# Patient Record
Sex: MALE | Race: BLACK OR AFRICAN AMERICAN | NOT HISPANIC OR LATINO | Employment: FULL TIME | URBAN - METROPOLITAN AREA
[De-identification: names, ages, dates, MRNs, and addresses within clinical notes are randomized per-mention and may not be internally consistent; named-entity substitution may affect disease eponyms.]

---

## 2017-07-26 ENCOUNTER — ALLSCRIPTS OFFICE VISIT (OUTPATIENT)
Dept: OTHER | Facility: OTHER | Age: 52
End: 2017-07-26

## 2017-07-26 DIAGNOSIS — Z13.6 ENCOUNTER FOR SCREENING FOR CARDIOVASCULAR DISORDERS: ICD-10-CM

## 2017-07-26 DIAGNOSIS — R30.0 DYSURIA: ICD-10-CM

## 2017-07-27 ENCOUNTER — LAB CONVERSION - ENCOUNTER (OUTPATIENT)
Dept: OTHER | Facility: OTHER | Age: 52
End: 2017-07-27

## 2017-07-27 LAB
BILIRUB UR QL STRIP: NEGATIVE
COLOR UR: NORMAL
COMMENT (HISTORICAL): CLEAR
FECAL OCCULT BLOOD DIAGNOSTIC (HISTORICAL): NEGATIVE
GLUCOSE (HISTORICAL): NEGATIVE
KETONES UR STRIP-MCNC: NEGATIVE MG/DL
LEUKOCYTE ESTERASE UR QL STRIP: NEGATIVE
NITRITE UR QL STRIP: NEGATIVE
PH UR STRIP.AUTO: 5.5 [PH] (ref 5–8)
PROT UR STRIP-MCNC: NEGATIVE MG/DL
SP GR UR STRIP.AUTO: 1.01 (ref 1–1.03)

## 2017-08-20 ENCOUNTER — ANESTHESIA EVENT (OUTPATIENT)
Dept: PERIOP | Facility: HOSPITAL | Age: 52
End: 2017-08-20
Payer: COMMERCIAL

## 2017-08-21 ENCOUNTER — HOSPITAL ENCOUNTER (OUTPATIENT)
Facility: HOSPITAL | Age: 52
Setting detail: OUTPATIENT SURGERY
Discharge: HOME | End: 2017-08-21
Attending: INTERNAL MEDICINE | Admitting: INTERNAL MEDICINE
Payer: COMMERCIAL

## 2017-08-21 ENCOUNTER — GENERIC CONVERSION - ENCOUNTER (OUTPATIENT)
Dept: OTHER | Facility: OTHER | Age: 52
End: 2017-08-21

## 2017-08-21 ENCOUNTER — ANESTHESIA (OUTPATIENT)
Dept: PERIOP | Facility: HOSPITAL | Age: 52
End: 2017-08-21
Payer: COMMERCIAL

## 2017-08-21 VITALS
RESPIRATION RATE: 16 BRPM | WEIGHT: 209.44 LBS | OXYGEN SATURATION: 97 % | BODY MASS INDEX: 27.76 KG/M2 | TEMPERATURE: 97 F | SYSTOLIC BLOOD PRESSURE: 122 MMHG | HEIGHT: 73 IN | DIASTOLIC BLOOD PRESSURE: 71 MMHG | HEART RATE: 76 BPM

## 2017-08-21 DIAGNOSIS — Z12.11 ENCOUNTER FOR SCREENING FOR MALIGNANT NEOPLASM OF COLON: ICD-10-CM

## 2017-08-21 DIAGNOSIS — R19.4 CHANGE IN BOWEL HABITS: ICD-10-CM

## 2017-08-21 PROCEDURE — 88305 TISSUE EXAM BY PATHOLOGIST: CPT | Performed by: INTERNAL MEDICINE

## 2017-08-21 RX ORDER — SODIUM CHLORIDE, SODIUM LACTATE, POTASSIUM CHLORIDE, CALCIUM CHLORIDE 600; 310; 30; 20 MG/100ML; MG/100ML; MG/100ML; MG/100ML
125 INJECTION, SOLUTION INTRAVENOUS CONTINUOUS
Status: DISCONTINUED | OUTPATIENT
Start: 2017-08-21 | End: 2017-08-21 | Stop reason: HOSPADM

## 2017-08-21 RX ORDER — LIDOCAINE HYDROCHLORIDE 10 MG/ML
INJECTION, SOLUTION INFILTRATION; PERINEURAL AS NEEDED
Status: DISCONTINUED | OUTPATIENT
Start: 2017-08-21 | End: 2017-08-21 | Stop reason: SURG

## 2017-08-21 RX ORDER — MULTIVITAMIN
1 TABLET ORAL DAILY
COMMUNITY

## 2017-08-21 RX ORDER — PROPOFOL 10 MG/ML
INJECTION, EMULSION INTRAVENOUS AS NEEDED
Status: DISCONTINUED | OUTPATIENT
Start: 2017-08-21 | End: 2017-08-21 | Stop reason: SURG

## 2017-08-21 RX ORDER — SENNA PLUS 8.6 MG/1
1 TABLET ORAL DAILY
COMMUNITY

## 2017-08-21 RX ADMIN — SODIUM CHLORIDE, SODIUM LACTATE, POTASSIUM CHLORIDE, AND CALCIUM CHLORIDE 125 ML/HR: .6; .31; .03; .02 INJECTION, SOLUTION INTRAVENOUS at 08:30

## 2017-08-21 RX ADMIN — PROPOFOL 50 MG: 10 INJECTION, EMULSION INTRAVENOUS at 08:49

## 2017-08-21 RX ADMIN — PROPOFOL 50 MG: 10 INJECTION, EMULSION INTRAVENOUS at 08:54

## 2017-08-21 RX ADMIN — PROPOFOL 100 MG: 10 INJECTION, EMULSION INTRAVENOUS at 08:42

## 2017-08-21 RX ADMIN — LIDOCAINE HYDROCHLORIDE 50 MG: 10 INJECTION, SOLUTION INFILTRATION; PERINEURAL at 08:42

## 2017-08-21 RX ADMIN — PROPOFOL 50 MG: 10 INJECTION, EMULSION INTRAVENOUS at 08:43

## 2017-08-24 ENCOUNTER — GENERIC CONVERSION - ENCOUNTER (OUTPATIENT)
Dept: OTHER | Facility: OTHER | Age: 52
End: 2017-08-24

## 2018-01-11 NOTE — RESULT NOTES
Verified Results  (1) TISSUE EXAM [id number] 08:46AM Abby Cuff     Test Name Result Flag Reference   LAB AP CASE REPORT (Report)     Surgical Pathology Report             Case: [id number]                   Authorizing Provider: Shira Ellis MD  Collected:      08/21/2017 0846        Ordering Location:   [facility] Received:      08/21/2017 [facility]                    Operating Room                                 Pathologist:      Bertram Joshi MD                              Specimen:  Large Intestine, Right/Ascending Colon, Ascending colon   LAB AP FINAL DIAGNOSIS (Report)     A  Colon, ascending, polypectomy:        - 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 or   [facility]  They may not be cleared or approved by the [place]  Food and Drug Administration  The FDA has determined that such clearance   or approval is not necessary  These tests are used for clinical purposes  They should not be regarded as investigational or for research  This   laboratory has been approved by CLIA 88, designated as a high-complexity   laboratory and is qualified to perform these tests  LAB AP GROSS DESCRIPTION (Report)     A  The specimen is received in formalin, labeled with the patient's name   and hospital number, and is designated polyp ascending colon   Specimen   consists of 2 tan-red polypoid tissue fragments measuring 1 2 and 1 3 cm   in greatest dimension  The apparent margin of resection is painted with   blue ink  Also submitted in the container are multiple tan soft tissue   fragments measuring in loose aggregates 1 9 x 1 0 x 0 2 cm  Entire   submitted  3 cassettes with the multiple fragments in cassette 1 in   cassettes 2? ??3 containing one polyp serially sectioned in each cassette  Note: The estimated total formalin fixation time based upon information   provided by the submitting clinician and the standard processing schedule   is 35 75 hours        AEK   LAB AP CLINICAL INFORMATION Hot snare     Hot snare

## 2018-01-12 VITALS
HEIGHT: 73 IN | BODY MASS INDEX: 29.42 KG/M2 | DIASTOLIC BLOOD PRESSURE: 82 MMHG | WEIGHT: 222 LBS | HEART RATE: 87 BPM | SYSTOLIC BLOOD PRESSURE: 134 MMHG

## 2020-04-24 ENCOUNTER — OFFICE VISIT (OUTPATIENT)
Dept: INTERNAL MEDICINE CLINIC | Facility: CLINIC | Age: 55
End: 2020-04-24
Payer: COMMERCIAL

## 2020-04-24 VITALS
TEMPERATURE: 96.4 F | WEIGHT: 221.6 LBS | HEART RATE: 80 BPM | SYSTOLIC BLOOD PRESSURE: 136 MMHG | BODY MASS INDEX: 30.02 KG/M2 | HEIGHT: 72 IN | OXYGEN SATURATION: 99 % | DIASTOLIC BLOOD PRESSURE: 90 MMHG

## 2020-04-24 DIAGNOSIS — R03.0 ELEVATED BP WITHOUT DIAGNOSIS OF HYPERTENSION: ICD-10-CM

## 2020-04-24 DIAGNOSIS — R07.9 CHEST PAIN, UNSPECIFIED TYPE: Primary | ICD-10-CM

## 2020-04-24 DIAGNOSIS — K21.9 GASTROESOPHAGEAL REFLUX DISEASE, ESOPHAGITIS PRESENCE NOT SPECIFIED: ICD-10-CM

## 2020-04-24 PROCEDURE — 3008F BODY MASS INDEX DOCD: CPT | Performed by: NURSE PRACTITIONER

## 2020-04-24 PROCEDURE — 1036F TOBACCO NON-USER: CPT | Performed by: NURSE PRACTITIONER

## 2020-04-24 PROCEDURE — 99204 OFFICE O/P NEW MOD 45 MIN: CPT | Performed by: NURSE PRACTITIONER

## 2020-04-24 PROCEDURE — 93000 ELECTROCARDIOGRAM COMPLETE: CPT | Performed by: NURSE PRACTITIONER

## 2020-04-24 RX ORDER — OMEPRAZOLE 40 MG/1
40 CAPSULE, DELAYED RELEASE ORAL 2 TIMES DAILY
Qty: 60 CAPSULE | Refills: 2 | Status: SHIPPED | OUTPATIENT
Start: 2020-04-24

## 2020-04-28 ENCOUNTER — APPOINTMENT (OUTPATIENT)
Dept: LAB | Facility: CLINIC | Age: 55
End: 2020-04-28
Payer: COMMERCIAL

## 2020-04-28 ENCOUNTER — TELEPHONE (OUTPATIENT)
Dept: INTERNAL MEDICINE CLINIC | Facility: CLINIC | Age: 55
End: 2020-04-28

## 2020-04-28 DIAGNOSIS — R03.0 ELEVATED BP WITHOUT DIAGNOSIS OF HYPERTENSION: ICD-10-CM

## 2020-04-28 LAB
ALBUMIN SERPL BCP-MCNC: 3.7 G/DL (ref 3.5–5)
ALP SERPL-CCNC: 57 U/L (ref 46–116)
ALT SERPL W P-5'-P-CCNC: 28 U/L (ref 12–78)
ANION GAP SERPL CALCULATED.3IONS-SCNC: 4 MMOL/L (ref 4–13)
AST SERPL W P-5'-P-CCNC: 15 U/L (ref 5–45)
BASOPHILS # BLD AUTO: 0.02 THOUSANDS/ΜL (ref 0–0.1)
BASOPHILS NFR BLD AUTO: 1 % (ref 0–1)
BILIRUB SERPL-MCNC: 0.5 MG/DL (ref 0.2–1)
BUN SERPL-MCNC: 8 MG/DL (ref 5–25)
CALCIUM SERPL-MCNC: 8.9 MG/DL (ref 8.3–10.1)
CHLORIDE SERPL-SCNC: 106 MMOL/L (ref 100–108)
CHOLEST SERPL-MCNC: 242 MG/DL (ref 50–200)
CO2 SERPL-SCNC: 30 MMOL/L (ref 21–32)
CREAT SERPL-MCNC: 0.98 MG/DL (ref 0.6–1.3)
EOSINOPHIL # BLD AUTO: 0.06 THOUSAND/ΜL (ref 0–0.61)
EOSINOPHIL NFR BLD AUTO: 2 % (ref 0–6)
ERYTHROCYTE [DISTWIDTH] IN BLOOD BY AUTOMATED COUNT: 14 % (ref 11.6–15.1)
GFR SERPL CREATININE-BSD FRML MDRD: 101 ML/MIN/1.73SQ M
GLUCOSE P FAST SERPL-MCNC: 93 MG/DL (ref 65–99)
HCT VFR BLD AUTO: 46.7 % (ref 36.5–49.3)
HDLC SERPL-MCNC: 39 MG/DL
HGB BLD-MCNC: 15.4 G/DL (ref 12–17)
IMM GRANULOCYTES # BLD AUTO: 0.01 THOUSAND/UL (ref 0–0.2)
IMM GRANULOCYTES NFR BLD AUTO: 0 % (ref 0–2)
LDLC SERPL CALC-MCNC: 147 MG/DL (ref 0–100)
LYMPHOCYTES # BLD AUTO: 0.95 THOUSANDS/ΜL (ref 0.6–4.47)
LYMPHOCYTES NFR BLD AUTO: 28 % (ref 14–44)
MCH RBC QN AUTO: 29.7 PG (ref 26.8–34.3)
MCHC RBC AUTO-ENTMCNC: 33 G/DL (ref 31.4–37.4)
MCV RBC AUTO: 90 FL (ref 82–98)
MONOCYTES # BLD AUTO: 0.34 THOUSAND/ΜL (ref 0.17–1.22)
MONOCYTES NFR BLD AUTO: 10 % (ref 4–12)
NEUTROPHILS # BLD AUTO: 2.03 THOUSANDS/ΜL (ref 1.85–7.62)
NEUTS SEG NFR BLD AUTO: 59 % (ref 43–75)
NONHDLC SERPL-MCNC: 203 MG/DL
NRBC BLD AUTO-RTO: 0 /100 WBCS
PLATELET # BLD AUTO: 157 THOUSANDS/UL (ref 149–390)
PMV BLD AUTO: 12.2 FL (ref 8.9–12.7)
POTASSIUM SERPL-SCNC: 3.9 MMOL/L (ref 3.5–5.3)
PROT SERPL-MCNC: 7.5 G/DL (ref 6.4–8.2)
RBC # BLD AUTO: 5.19 MILLION/UL (ref 3.88–5.62)
SODIUM SERPL-SCNC: 140 MMOL/L (ref 136–145)
TRIGL SERPL-MCNC: 280 MG/DL
WBC # BLD AUTO: 3.41 THOUSAND/UL (ref 4.31–10.16)

## 2020-04-28 PROCEDURE — 80061 LIPID PANEL: CPT

## 2020-04-28 PROCEDURE — 80053 COMPREHEN METABOLIC PANEL: CPT

## 2020-04-28 PROCEDURE — 36415 COLL VENOUS BLD VENIPUNCTURE: CPT

## 2020-04-28 PROCEDURE — 85025 COMPLETE CBC W/AUTO DIFF WBC: CPT

## 2020-07-17 ENCOUNTER — DOCUMENTATION (OUTPATIENT)
Dept: GASTROENTEROLOGY | Facility: CLINIC | Age: 55
End: 2020-07-17

## 2020-07-17 NOTE — LETTER
7/17/2020    Dear Janay Simon,    Review of our records shows you are due for the following:    Colonoscopy    Please call the following office to schedule your appointment:    [place]    We look forward to hearing from you      Sincerely,    Dr Leonard Garza

## 2021-05-26 ENCOUNTER — OFFICE VISIT (OUTPATIENT)
Dept: CARDIOLOGY CLINIC | Facility: CLINIC | Age: 56
End: 2021-05-26
Payer: COMMERCIAL

## 2021-05-26 VITALS
WEIGHT: 204 LBS | SYSTOLIC BLOOD PRESSURE: 112 MMHG | HEIGHT: 73 IN | HEART RATE: 80 BPM | DIASTOLIC BLOOD PRESSURE: 76 MMHG | BODY MASS INDEX: 27.04 KG/M2

## 2021-05-26 DIAGNOSIS — E78.5 HYPERLIPIDEMIA, UNSPECIFIED HYPERLIPIDEMIA TYPE: ICD-10-CM

## 2021-05-26 DIAGNOSIS — R14.0 ABDOMINAL BLOATING: ICD-10-CM

## 2021-05-26 DIAGNOSIS — R07.9 CHEST PAIN, UNSPECIFIED TYPE: Primary | ICD-10-CM

## 2021-05-26 DIAGNOSIS — E66.3 OVERWEIGHT (BMI 25.0-29.9): ICD-10-CM

## 2021-05-26 PROCEDURE — 1036F TOBACCO NON-USER: CPT | Performed by: INTERNAL MEDICINE

## 2021-05-26 PROCEDURE — 99204 OFFICE O/P NEW MOD 45 MIN: CPT | Performed by: INTERNAL MEDICINE

## 2021-05-26 PROCEDURE — 93000 ELECTROCARDIOGRAM COMPLETE: CPT | Performed by: INTERNAL MEDICINE

## 2021-05-26 PROCEDURE — 3008F BODY MASS INDEX DOCD: CPT | Performed by: INTERNAL MEDICINE

## 2021-05-26 NOTE — PROGRESS NOTES
[facility] [address]  [phone]                                              Cardiology Office Consult  Porfirio Cox, 55 y o  male  YOB: 1965  MRN: [id number] Encounter: [id number]      PCP - ELIEZER Ortega    Assessment  1  Chest pain  2  Hyperlipidemia  3  Bloating / GERD    Plan  Chest pain  · Atypical, initial episode 1 month ago, had recurrence yesterday  · Has some pleuritic features as well  · ?musculoskeletal v CAD  · ECG without any acute St-T changes  ·  he does have severe hyperlipidemia, and with persistent symptoms will check an exercise stress test to rule out CAD     Hyperlipidemia   4/28/2020 13:13   Cholesterol 242 (H)   Triglycerides 280 (H)   HDL 39 (L)   Non-HDL Cholesterol 203   LDL Calculated 147 (H)   · Cholesterol levels were quite uncontrolled last year, although it appears he was 15 lb heavier back pain   · Counseled regarding dietary modifications and need for increased exercise   · Repeat lipid panel next month   · If cholesterol continues to be elevated, suggest low-dose statins    Bloating / GERD  ·  tried omeprazole without benefit   · Reports some bloating sensation   · Counseled regarding monitoring for on foods are contributing to this  · ?gluten-allergy  · Follow up with PCP    Orders Placed This Encounter   Procedures    Lipid Panel with Direct LDL reflex    Stress test only, exercise    POCT ECG     Results for orders placed or performed in visit on 05/26/21   POCT ECG    Impression     Normal sinus rhythm, normal ECG     Return in about 4 months (around 9/26/2021), or if symptoms worsen or fail to improve  History of Present Illness    55year-old very pleasant gentleman comes in as a new patient for evaluation of ongoing symptoms of chest pain  He describes it as the left-sided pain, occurring intermittently lasting for up to a day    His initial symptoms happened about a month ago, when he initially requested the appointment  He reports having lifted a heavy slab of marble around that time  It gradually improved, but he had recurrence of the same yesterday, and as a result he is here for evaluation  No clear associated symptoms of nausea, vomiting, diaphoresis or shortness of breath  No complains of palpitations  He does not smoke  No family history of early CAD  Both his parents lived to their 80s, and his mother is still alive at 80  Historical Information   Past Medical History:   Diagnosis Date    Asthma     pt  states he had asthma when he was a child    GERD (gastroesophageal reflux disease)      Past Surgical History:   Procedure Laterality Date    NC COLONOSCOPY FLX DX W/COLLJ SPEC WHEN PFRMD N/A 8/21/2017    Procedure: COLONOSCOPY;  Surgeon: Shanti Flores MD;  Location: [facility]; Service: Gastroenterology     Family History   Problem Relation Age of Onset    Hypertension Father      Current Outpatient Medications on File Prior to Visit   Medication Sig Dispense Refill    Multiple Vitamin (MULTIVITAMIN) tablet Take 1 tablet by mouth daily      omeprazole (PriLOSEC) 40 MG capsule Take 1 capsule (40 mg total) by mouth 2 (two) times a day 60 capsule 2    senna (SENOKOT) 8 6 MG tablet Take 1 tablet by mouth daily       No current facility-administered medications on file prior to visit        Allergies   Allergen Reactions    Pollen Extract      Social History     Socioeconomic History    Marital status: /Civil Union     Spouse name: None    Number of children: None    Years of education: None    Highest education level: None   Occupational History    None   Social Needs    Financial resource strain: None    Food insecurity     Worry: None     Inability: None    Transportation needs     Medical: None     Non-medical: None   Tobacco Use    Smoking status: Never Smoker    Smokeless tobacco: Never Used   Substance and Sexual Activity    Alcohol use: No    Drug use: No    Sexual activity: Not Currently   Lifestyle    Physical activity     Days per week: 0 days     Minutes per session: 0 min    Stress: Not at all   Relationships    Social connections     Talks on phone: None     Gets together: None     Attends Rastafari service: None     Active member of club or organization: None     Attends meetings of clubs or organizations: None     Relationship status: None    Intimate partner violence     Fear of current or ex partner: None     Emotionally abused: None     Physically abused: None     Forced sexual activity: None   Other Topics Concern    None   Social History Narrative    None        Review of Systems   All other systems reviewed and are negative  Vitals:  Vitals:    05/26/21 1510   BP: 112/76   Pulse: 80   Weight: 92 5 kg (204 lb)   Height: 6' 1" (1 854 m)     BMI - Body mass index is 26 91 kg/m²  Wt Readings from Last 7 Encounters:   05/26/21 92 5 kg (204 lb)   04/24/20 101 kg (221 lb 9 6 oz)   08/21/17 95 kg (209 lb 7 oz)   07/26/17 101 kg (222 lb)       Physical Exam  Vitals signs and nursing note reviewed  Constitutional:       General: He is not in acute distress  Appearance: Normal appearance  He is well-developed  He is not ill-appearing or diaphoretic  HENT:      Head: Normocephalic and atraumatic  Nose: No congestion  Eyes:      General: No scleral icterus  Conjunctiva/sclera: Conjunctivae normal    Neck:      Musculoskeletal: Neck supple  No muscular tenderness  Vascular: No carotid bruit or JVD  Cardiovascular:      Rate and Rhythm: Normal rate and regular rhythm  Heart sounds: Normal heart sounds  No murmur  No friction rub  No gallop  Pulmonary:      Effort: Pulmonary effort is normal  No respiratory distress  Breath sounds: Normal breath sounds  No wheezing or rales  Chest:      Chest wall: No tenderness  Abdominal:      General: There is no distension  Palpations: Abdomen is soft  Tenderness: There is no abdominal tenderness  Musculoskeletal:         General: No swelling, tenderness or deformity  Right lower leg: No edema  Left lower leg: No edema  Skin:     General: Skin is warm  Neurological:      General: No focal deficit present  Mental Status: He is alert and oriented to person, place, and time  Mental status is at baseline  Psychiatric:         Mood and Affect: Mood normal          Behavior: Behavior normal          Thought Content: Thought content normal          Labs:  CBC:   Lab Results   Component Value Date    WBC 3 41 (L) 04/28/2020    RBC 5 19 04/28/2020    HGB 15 4 04/28/2020    HCT 46 7 04/28/2020    MCV 90 04/28/2020     04/28/2020    RDW 14 0 04/28/2020       CMP:   Lab Results   Component Value Date    K 3 9 04/28/2020     04/28/2020    CO2 30 04/28/2020    BUN 8 04/28/2020    CREATININE 0 98 04/28/2020    EGFR 101 04/28/2020    CALCIUM 8 9 04/28/2020    AST 15 04/28/2020    ALT 28 04/28/2020    ALKPHOS 57 04/28/2020       Magnesium:  No results found for: MG    Lipid Profile:   Lab Results   Component Value Date    HDL 39 (L) 04/28/2020    TRIG 280 (H) 04/28/2020    LDLCALC 147 (H) 04/28/2020       Thyroid Studies: No results found for: [id number], T3FREE, FREET4, Q4KCIHE, S5VXDAZ    No components found for: HGA1C    No results found for: OVH6    Imaging: No results found  Cardiac testing:   No results found for this or any previous visit  No results found for this or any previous visit  No results found for this or any previous visit  No results found for this or any previous visit

## 2021-05-26 NOTE — PATIENT INSTRUCTIONS

## 2025-01-30 ENCOUNTER — HOSPITAL ENCOUNTER (EMERGENCY)
Facility: HOSPITAL | Age: 60
Discharge: HOME | End: 2025-01-30
Attending: EMERGENCY MEDICINE
Payer: COMMERCIAL

## 2025-01-30 ENCOUNTER — TELEPHONE (OUTPATIENT)
Age: 60
End: 2025-01-30

## 2025-01-30 ENCOUNTER — APPOINTMENT (EMERGENCY)
Dept: CT IMAGING | Facility: HOSPITAL | Age: 60
End: 2025-01-30
Payer: COMMERCIAL

## 2025-01-30 ENCOUNTER — APPOINTMENT (EMERGENCY)
Dept: ULTRASOUND IMAGING | Facility: HOSPITAL | Age: 60
End: 2025-01-30
Payer: COMMERCIAL

## 2025-01-30 VITALS
DIASTOLIC BLOOD PRESSURE: 66 MMHG | BODY MASS INDEX: 26.64 KG/M2 | WEIGHT: 201.94 LBS | RESPIRATION RATE: 16 BRPM | TEMPERATURE: 98.5 F | SYSTOLIC BLOOD PRESSURE: 131 MMHG | OXYGEN SATURATION: 99 % | HEART RATE: 90 BPM

## 2025-01-30 DIAGNOSIS — R31.9 HEMATURIA: ICD-10-CM

## 2025-01-30 DIAGNOSIS — N40.0 BPH (BENIGN PROSTATIC HYPERPLASIA): ICD-10-CM

## 2025-01-30 DIAGNOSIS — N21.0 BLADDER STONE: ICD-10-CM

## 2025-01-30 DIAGNOSIS — R10.30 LOWER ABDOMINAL PAIN: ICD-10-CM

## 2025-01-30 DIAGNOSIS — N30.00 ACUTE CYSTITIS: Primary | ICD-10-CM

## 2025-01-30 LAB
ALBUMIN SERPL BCG-MCNC: 4.4 G/DL (ref 3.5–5)
ALP SERPL-CCNC: 52 U/L (ref 34–104)
ALT SERPL W P-5'-P-CCNC: 11 U/L (ref 7–52)
ANION GAP SERPL CALCULATED.3IONS-SCNC: 4 MMOL/L (ref 4–13)
AST SERPL W P-5'-P-CCNC: 15 U/L (ref 13–39)
BACTERIA UR QL AUTO: ABNORMAL /HPF
BASOPHILS # BLD AUTO: 0.02 THOUSANDS/ΜL (ref 0–0.1)
BASOPHILS NFR BLD AUTO: 0 % (ref 0–1)
BILIRUB DIRECT SERPL-MCNC: 0.05 MG/DL (ref 0–0.2)
BILIRUB SERPL-MCNC: 0.73 MG/DL (ref 0.2–1)
BILIRUB UR QL STRIP: NEGATIVE
BUN SERPL-MCNC: 9 MG/DL (ref 5–25)
CALCIUM SERPL-MCNC: 9.3 MG/DL (ref 8.4–10.2)
CHLORIDE SERPL-SCNC: 101 MMOL/L (ref 96–108)
CLARITY UR: CLEAR
CO2 SERPL-SCNC: 31 MMOL/L (ref 21–32)
COLOR UR: ABNORMAL
CREAT SERPL-MCNC: 0.95 MG/DL (ref 0.6–1.3)
EOSINOPHIL # BLD AUTO: 0.18 THOUSAND/ΜL (ref 0–0.61)
EOSINOPHIL NFR BLD AUTO: 4 % (ref 0–6)
ERYTHROCYTE [DISTWIDTH] IN BLOOD BY AUTOMATED COUNT: 13.2 % (ref 11.6–15.1)
GFR SERPL CREATININE-BSD FRML MDRD: 87 ML/MIN/1.73SQ M
GLUCOSE SERPL-MCNC: 108 MG/DL (ref 65–140)
GLUCOSE UR STRIP-MCNC: NEGATIVE MG/DL
HCT VFR BLD AUTO: 46.6 % (ref 36.5–49.3)
HGB BLD-MCNC: 15.5 G/DL (ref 12–17)
HGB UR QL STRIP.AUTO: ABNORMAL
IMM GRANULOCYTES # BLD AUTO: 0.01 THOUSAND/UL (ref 0–0.2)
IMM GRANULOCYTES NFR BLD AUTO: 0 % (ref 0–2)
KETONES UR STRIP-MCNC: NEGATIVE MG/DL
LEUKOCYTE ESTERASE UR QL STRIP: ABNORMAL
LYMPHOCYTES # BLD AUTO: 0.78 THOUSANDS/ΜL (ref 0.6–4.47)
LYMPHOCYTES NFR BLD AUTO: 17 % (ref 14–44)
MCH RBC QN AUTO: 30.4 PG (ref 26.8–34.3)
MCHC RBC AUTO-ENTMCNC: 33.3 G/DL (ref 31.4–37.4)
MCV RBC AUTO: 91 FL (ref 82–98)
MONOCYTES # BLD AUTO: 0.45 THOUSAND/ΜL (ref 0.17–1.22)
MONOCYTES NFR BLD AUTO: 10 % (ref 4–12)
NEUTROPHILS # BLD AUTO: 3.03 THOUSANDS/ΜL (ref 1.85–7.62)
NEUTS SEG NFR BLD AUTO: 69 % (ref 43–75)
NITRITE UR QL STRIP: NEGATIVE
NON-SQ EPI CELLS URNS QL MICRO: ABNORMAL /HPF
NRBC BLD AUTO-RTO: 0 /100 WBCS
PH UR STRIP.AUTO: 5.5 [PH]
PLATELET # BLD AUTO: 167 THOUSANDS/UL (ref 149–390)
PMV BLD AUTO: 11.2 FL (ref 8.9–12.7)
POTASSIUM SERPL-SCNC: 4.3 MMOL/L (ref 3.5–5.3)
PROT SERPL-MCNC: 7.1 G/DL (ref 6.4–8.4)
PROT UR STRIP-MCNC: NEGATIVE MG/DL
RBC # BLD AUTO: 5.1 MILLION/UL (ref 3.88–5.62)
RBC #/AREA URNS AUTO: ABNORMAL /HPF
SODIUM SERPL-SCNC: 136 MMOL/L (ref 135–147)
SP GR UR STRIP.AUTO: 1 (ref 1–1.03)
UROBILINOGEN UR STRIP-ACNC: <2 MG/DL
WBC # BLD AUTO: 4.47 THOUSAND/UL (ref 4.31–10.16)
WBC #/AREA URNS AUTO: ABNORMAL /HPF

## 2025-01-30 PROCEDURE — 96361 HYDRATE IV INFUSION ADD-ON: CPT

## 2025-01-30 PROCEDURE — 81001 URINALYSIS AUTO W/SCOPE: CPT | Performed by: EMERGENCY MEDICINE

## 2025-01-30 PROCEDURE — 96374 THER/PROPH/DIAG INJ IV PUSH: CPT

## 2025-01-30 PROCEDURE — 99285 EMERGENCY DEPT VISIT HI MDM: CPT | Performed by: EMERGENCY MEDICINE

## 2025-01-30 PROCEDURE — 76870 US EXAM SCROTUM: CPT

## 2025-01-30 PROCEDURE — 36415 COLL VENOUS BLD VENIPUNCTURE: CPT | Performed by: EMERGENCY MEDICINE

## 2025-01-30 PROCEDURE — 99283 EMERGENCY DEPT VISIT LOW MDM: CPT

## 2025-01-30 PROCEDURE — 74177 CT ABD & PELVIS W/CONTRAST: CPT

## 2025-01-30 PROCEDURE — 87086 URINE CULTURE/COLONY COUNT: CPT | Performed by: EMERGENCY MEDICINE

## 2025-01-30 PROCEDURE — 96375 TX/PRO/DX INJ NEW DRUG ADDON: CPT

## 2025-01-30 PROCEDURE — 80076 HEPATIC FUNCTION PANEL: CPT | Performed by: EMERGENCY MEDICINE

## 2025-01-30 PROCEDURE — 85025 COMPLETE CBC W/AUTO DIFF WBC: CPT | Performed by: EMERGENCY MEDICINE

## 2025-01-30 PROCEDURE — 80048 BASIC METABOLIC PNL TOTAL CA: CPT | Performed by: EMERGENCY MEDICINE

## 2025-01-30 RX ORDER — PHENAZOPYRIDINE HYDROCHLORIDE 200 MG/1
200 TABLET, FILM COATED ORAL 3 TIMES DAILY PRN
Qty: 6 TABLET | Refills: 0 | Status: SHIPPED | OUTPATIENT
Start: 2025-01-30

## 2025-01-30 RX ORDER — CEFUROXIME AXETIL 250 MG/1
500 TABLET ORAL ONCE
Status: COMPLETED | OUTPATIENT
Start: 2025-01-30 | End: 2025-01-30

## 2025-01-30 RX ORDER — CEFUROXIME AXETIL 500 MG/1
500 TABLET ORAL EVERY 12 HOURS SCHEDULED
Qty: 20 TABLET | Refills: 0 | Status: SHIPPED | OUTPATIENT
Start: 2025-01-31 | End: 2025-02-10

## 2025-01-30 RX ORDER — KETOROLAC TROMETHAMINE 30 MG/ML
15 INJECTION, SOLUTION INTRAMUSCULAR; INTRAVENOUS ONCE
Status: COMPLETED | OUTPATIENT
Start: 2025-01-30 | End: 2025-01-30

## 2025-01-30 RX ORDER — MORPHINE SULFATE 4 MG/ML
4 INJECTION, SOLUTION INTRAMUSCULAR; INTRAVENOUS ONCE
Status: COMPLETED | OUTPATIENT
Start: 2025-01-30 | End: 2025-01-30

## 2025-01-30 RX ORDER — PHENAZOPYRIDINE HYDROCHLORIDE 100 MG/1
200 TABLET, FILM COATED ORAL ONCE
Status: COMPLETED | OUTPATIENT
Start: 2025-01-30 | End: 2025-01-30

## 2025-01-30 RX ORDER — MORPHINE SULFATE 15 MG/1
7.5 TABLET ORAL EVERY 4 HOURS PRN
Qty: 8 TABLET | Refills: 0 | Status: SHIPPED | OUTPATIENT
Start: 2025-01-30

## 2025-01-30 RX ADMIN — PHENAZOPYRIDINE 200 MG: 100 TABLET ORAL at 19:46

## 2025-01-30 RX ADMIN — CEFUROXIME AXETIL 500 MG: 250 TABLET ORAL at 20:23

## 2025-01-30 RX ADMIN — MORPHINE SULFATE 4 MG: 4 INJECTION INTRAVENOUS at 19:47

## 2025-01-30 RX ADMIN — KETOROLAC TROMETHAMINE 15 MG: 30 INJECTION, SOLUTION INTRAMUSCULAR; INTRAVENOUS at 19:46

## 2025-01-30 RX ADMIN — SODIUM CHLORIDE 500 ML: 0.9 INJECTION, SOLUTION INTRAVENOUS at 17:13

## 2025-01-30 RX ADMIN — IOHEXOL 100 ML: 350 INJECTION, SOLUTION INTRAVENOUS at 18:19

## 2025-01-30 NOTE — ED PROVIDER NOTES
Time reflects when diagnosis was documented in both MDM as applicable and the Disposition within this note       Time User Action Codes Description Comment    1/30/2025  8:24 PM Aufiero, Abril E Add [N30.00] Acute cystitis     1/30/2025  8:24 PM Aufiero, Abril E Add [R31.9] Hematuria     1/30/2025  8:24 PM Aufiero, Abril E Add [N21.0] Bladder stone     1/30/2025  8:24 PM Aufiero, Abril E Add [R10.30] Lower abdominal pain     1/30/2025  8:25 PM Aufiero, Abril E Add [N40.0] BPH (benign prostatic hyperplasia)           ED Disposition       ED Disposition   Discharge    Condition   Stable    Date/Time   Thu Jan 30, 2025  8:24 PM    Comment   Ethan BRIELLE Villarreal discharge to home/self care.                   Assessment & Plan       Medical Decision Making  59-year-old male presents to the ED with hematuria starting 2 weeks ago followed by difficulty urinating, increased urinary frequency, occasional dysuria, bilateral abdominal discomfort and low back pain.  Differential diagnosis includes UTI, pyelonephritis, kidney stone, prostatitis, bladder mass or malignancy, orchitis or epididymitis.  Will evaluate with UA and culture, CT scan of the abdomen and pelvis as well as scrotal/testicular ultrasound.  Will check basic labs and provide small fluid bolus.    Amount and/or Complexity of Data Reviewed  Labs: ordered. Decision-making details documented in ED Course.  Radiology: ordered. Decision-making details documented in ED Course.    Risk  Prescription drug management.        ED Course as of 01/30/25 2028   Thu Jan 30, 2025   1719 WBC: 4.47   1719 Hemoglobin: 15.5   1719 Platelet Count: 167   1730 Reviewed UA and micro and not significantly convincing of infection.  Urine culture sent.   1746 GFR, Calculated: 87   1746 Creatinine: 0.95   1935 Bladder scan had only revealed 37 cc of urine so no significant urinary retention.  Will discuss CT findings with urology but will likely place on antibiotics for presumed UTI/possible  prostatitis.   1947 Patient requesting something for pain as he is having a lot of discomfort in the lower abdomen and when he urinates.  Will give Pyridium as well as morphine and Toradol.  I reevaluated patient and updated he and wife of normal blood work results as well as ultrasound and CT findings.  I did discuss all of the incidental findings and plan to reach out to urology for recommendations.  Patient does have an appointment for the first time at [facility] urology [place] office with Rain Priest tomorrow.   1950 Messaged on-call urology CRNP, Emelia Curry.    2007 Spoke with urology NP who agreed that his mild hydro is likely secondary to chronic bladder outlet obstruction from BPH and given normal renal function, absence of flank pain, nothing to do for this right now.  She agreed with treating for cystitis but did not feel he needs any specific treatment for prostatitis.  She advised that patient keep his appointment scheduled for tomorrow as he will likely need outpatient cystoscopy.  Will start patient on cefuroxime and discharge home.   2008 Advised patient to follow-up tomorrow with the urology office.  Discussed ED return parameters with patient clued and but not limited to new fevers or chills, vomiting, uncontrolled pain, flank pain or any other new concerning symptoms.       Medications   sodium chloride 0.9 % bolus 500 mL (0 mL Intravenous Stopped 1/30/25 1913)   iohexol (OMNIPAQUE) 350 MG/ML injection (MULTI-DOSE) 100 mL (100 mL Intravenous Given 1/30/25 1819)   ketorolac (TORADOL) injection 15 mg (15 mg Intravenous Given 1/30/25 1946)   morphine injection 4 mg (4 mg Intravenous Given 1/30/25 1947)   phenazopyridine (PYRIDIUM) tablet 200 mg (200 mg Oral Given 1/30/25 1946)   cefuroxime (CEFTIN) tablet 500 mg (500 mg Oral Given 1/30/25 2023)       ED Risk Strat Scores                          SBIRT 22yo+      Flowsheet Row Most Recent Value   Initial Alcohol Screen: US AUDIT-C     1. How  often do you have a drink containing alcohol? 0 Filed at: 01/30/2025 2027   2. How many drinks containing alcohol do you have on a typical day you are drinking?  0 Filed at: 01/30/2025 2027   3a. Male UNDER 65: How often do you have five or more drinks on one occasion? 0 Filed at: 01/30/2025 2027   Audit-C Score 0 Filed at: 01/30/2025 2027   COLLINS: How many times in the past year have you...    Used an illegal drug or used a prescription medication for non-medical reasons? Never Filed at: 01/30/2025 2027                            History of Present Illness       Chief Complaint   Patient presents with    Possible UTI     Pt arrives c/o frequent urination and burning during urination. Pt reports forcing a stream to urinate hx of bph. Reports blood in urine 2 weeks and abdominal pressure. Denies fevers       Past Medical History:   Diagnosis Date    Asthma     pt. states he had asthma when he was a child    GERD (gastroesophageal reflux disease)       Past Surgical History:   Procedure Laterality Date    ME COLONOSCOPY FLX DX W/COLLJ SPEC WHEN PFRMD N/A 8/21/2017    Procedure: COLONOSCOPY;  Surgeon: Armando Davila III, MD;  Location: [facility];  Service: Gastroenterology      Family History   Problem Relation Age of Onset    Hypertension Father       Social History     Tobacco Use    Smoking status: Never    Smokeless tobacco: Never   Vaping Use    Vaping status: Never Used   Substance Use Topics    Alcohol use: No    Drug use: No      E-Cigarette/Vaping    E-Cigarette Use Never User       E-Cigarette/Vaping Substances    Nicotine No     THC No     CBD No     Flavoring No     Other No     Unknown No       I have reviewed and agree with the history as documented.     Patient is a 59-year-old male with past medical history significant for BPH for which he takes tamsulosin, presents to the emergency department for hematuria, increased urinary frequency, testicular pain and abdominal discomfort.  Patient states that  about 2 weeks ago he started with gross hematuria that lasted about a week.  He reports approximately 1 week ago he started to notice that the urine started to clear up and it was not as bloody but he would occasionally have dysuria and it has become more difficult for him to urinate.  He reports that his wife gave him over-the-counter Azo and since he started taking that over the past couple of days he has noticed increased urinary frequency.  He states he has to go to the bathroom every 5 minutes.  He also started to feel pain in both testicles and when he does get the testicular pain it causes increased urgency to pee.  He also reports low back midline/rectal region pressure especially when sitting.  He also feels a pressure in both sides of his mid to lower abdomen.  He denies ever having this types of symptoms before.  On review of systems, he does report increased frequency of loose stool recently since all of this started.  He denies any associated fevers or chills, headache, dizziness or any syncope, cough, URI symptoms, chest pain, shortness of breath, palpitations, abdominal distention, nausea, vomiting, blood per rectum, melena, penile discharge or pain, groin pain, skin rash or color change, focal neurologic deficits.  Denies any prior history of prostatitis, UTI, renal stones.  Denies being on any blood thinners and states the only prescription medication he takes is tamsulosin.      History provided by:  Patient and spouse   used: No        Review of Systems   Constitutional:  Negative for chills and fever.   HENT:  Negative for congestion, ear pain, rhinorrhea and sore throat.    Respiratory:  Negative for cough, chest tightness, shortness of breath and wheezing.    Cardiovascular:  Negative for chest pain and palpitations.   Gastrointestinal:  Positive for abdominal pain and diarrhea. Negative for abdominal distention, blood in stool, constipation, nausea and vomiting.    Genitourinary:  Positive for difficulty urinating, dysuria, frequency, hematuria, testicular pain and urgency. Negative for flank pain, penile discharge, penile pain, penile swelling and scrotal swelling.   Musculoskeletal:  Positive for back pain. Negative for neck pain and neck stiffness.   Skin:  Negative for color change, pallor, rash and wound.   Allergic/Immunologic: Negative for immunocompromised state.   Neurological:  Negative for dizziness, syncope, weakness, light-headedness, numbness and headaches.   Hematological:  Negative for adenopathy. Does not bruise/bleed easily.   Psychiatric/Behavioral:  Negative for confusion, decreased concentration and sleep disturbance.    All other systems reviewed and are negative.          Objective       ED Triage Vitals   Temperature Pulse Blood Pressure Respirations SpO2 Patient Position - Orthostatic VS   01/30/25 1652 01/30/25 1651 01/30/25 1651 01/30/25 1651 01/30/25 1651 01/30/25 1651   98.5 °F (36.9 °C) 80 161/83 19 99 % Lying      Temp Source Heart Rate Source BP Location FiO2 (%) Pain Score    01/30/25 1652 01/30/25 1651 01/30/25 1651 -- 01/30/25 1910    Oral Monitor Right arm  7      Vitals      Date and Time Temp Pulse SpO2 Resp BP Pain Score FACES Pain Rating User   01/30/25 2026 -- 90 99 % 16 131/66 -- -- CZ   01/30/25 1946 -- -- -- -- -- 7 -- CZ   01/30/25 1910 -- 81 96 % 15 165/79 7 -- KG   01/30/25 1652 98.5 °F (36.9 °C) -- -- -- -- -- -- IRIS   01/30/25 1651 -- 80 99 % 19 161/83 -- -- IRIS          Vitals:    01/30/25 1652 01/30/25 1653 01/30/25 1910 01/30/25 2026   BP:   165/79 131/66   BP Location:   Right arm Right arm   Pulse:   81 90   Resp:   15 16   Temp: 98.5 °F (36.9 °C)      TempSrc: Oral      SpO2:   96% 99%   Weight:  91.6 kg (201 lb 15.1 oz)          Physical Exam  Vitals and nursing note reviewed.   Constitutional:       General: He is not in acute distress.     Appearance: Normal appearance. He is well-developed. He is not ill-appearing,  toxic-appearing or diaphoretic.   HENT:      Head: Normocephalic and atraumatic.      Right Ear: External ear normal.      Left Ear: External ear normal.      Nose: Nose normal.      Mouth/Throat:      Mouth: Mucous membranes are moist.      Pharynx: Oropharynx is clear.   Eyes:      Extraocular Movements: Extraocular movements intact.      Conjunctiva/sclera: Conjunctivae normal.   Neck:      Vascular: No JVD.   Cardiovascular:      Rate and Rhythm: Normal rate and regular rhythm.      Pulses: Normal pulses.      Heart sounds: Normal heart sounds. No murmur heard.     No friction rub. No gallop.   Pulmonary:      Effort: Pulmonary effort is normal. No respiratory distress.      Breath sounds: Normal breath sounds. No wheezing, rhonchi or rales.   Abdominal:      General: There is no distension.      Palpations: Abdomen is soft.      Tenderness: There is abdominal tenderness. There is no right CVA tenderness, left CVA tenderness, guarding or rebound.      Comments: Right lower quadrant and suprapubic abdominal tenderness.   Genitourinary:     Penis: Normal.       Testes: Normal.   Musculoskeletal:         General: No swelling or tenderness. Normal range of motion.      Cervical back: Normal range of motion and neck supple. No rigidity.   Skin:     General: Skin is warm and dry.      Coloration: Skin is not pale.      Findings: No erythema or rash.   Neurological:      General: No focal deficit present.      Mental Status: He is alert and oriented to person, place, and time.      Sensory: No sensory deficit.      Motor: No weakness.   Psychiatric:         Mood and Affect: Mood normal.         Behavior: Behavior normal.         Results Reviewed       Procedure Component Value Units Date/Time    Urine culture [[id number]] Collected: 01/30/25 1707    Lab Status: In process Specimen: Urine, Clean Catch Updated: 01/30/25 1741    Basic metabolic panel [[id number]] Collected: 01/30/25 1712    Lab Status: Final result Specimen:  Blood from Arm, Right Updated: 01/30/25 1738     Sodium 136 mmol/L      Potassium 4.3 mmol/L      Chloride 101 mmol/L      CO2 31 mmol/L      ANION GAP 4 mmol/L      BUN 9 mg/dL      Creatinine 0.95 mg/dL      Glucose 108 mg/dL      Calcium 9.3 mg/dL      eGFR 87 ml/min/1.73sq m     Narrative:      National Kidney Disease Foundation guidelines for Chronic Kidney Disease (CKD):     Stage 1 with normal or high GFR (GFR > 90 mL/min/1.73 square meters)    Stage 2 Mild CKD (GFR = 60-89 mL/min/1.73 square meters)    Stage 3A Moderate CKD (GFR = 45-59 mL/min/1.73 square meters)    Stage 3B Moderate CKD (GFR = 30-44 mL/min/1.73 square meters)    Stage 4 Severe CKD (GFR = 15-29 mL/min/1.73 square meters)    Stage 5 End Stage CKD (GFR <15 mL/min/1.73 square meters)  Note: GFR calculation is accurate only with a steady state creatinine    Hepatic function panel [[id number]]  (Normal) Collected: 01/30/25 1712    Lab Status: Final result Specimen: Blood from Arm, Right Updated: 01/30/25 1738     Total Bilirubin 0.73 mg/dL      Bilirubin, Direct 0.05 mg/dL      Alkaline Phosphatase 52 U/L      AST 15 U/L      ALT 11 U/L      Total Protein 7.1 g/dL      Albumin 4.4 g/dL     Urine Microscopic [[id number]]  (Abnormal) Collected: 01/30/25 1707    Lab Status: Final result Specimen: Urine, Clean Catch Updated: 01/30/25 1721     RBC, UA 1-2 /hpf      WBC, UA 2-4 /hpf      Epithelial Cells Occasional /hpf      Bacteria, UA Occasional /hpf     UA (URINE) with reflex to Scope [[id number]]  (Abnormal) Collected: 01/30/25 1707    Lab Status: Final result Specimen: Urine, Clean Catch Updated: 01/30/25 1720     Color, UA Dark Yellow     Clarity, UA Clear     Specific Gravity, UA 1.002     pH, UA 5.5     Leukocytes, UA Trace     Nitrite, UA Negative     Protein, UA Negative mg/dl      Glucose, UA Negative mg/dl      Ketones, UA Negative mg/dl      Urobilinogen, UA <2.0 mg/dl      Bilirubin, UA Negative     Occult Blood, UA Moderate    CBC and  differential [[id number]] Collected: 01/30/25 1712    Lab Status: Final result Specimen: Blood from Arm, Right Updated: 01/30/25 1719     WBC 4.47 Thousand/uL      RBC 5.10 Million/uL      Hemoglobin 15.5 g/dL      Hematocrit 46.6 %      MCV 91 fL      MCH 30.4 pg      MCHC 33.3 g/dL      RDW 13.2 %      MPV 11.2 fL      Platelets 167 Thousands/uL      nRBC 0 /100 WBCs      Segmented % 69 %      Immature Grans % 0 %      Lymphocytes % 17 %      Monocytes % 10 %      Eosinophils Relative 4 %      Basophils Relative 0 %      Absolute Neutrophils 3.03 Thousands/µL      Absolute Immature Grans 0.01 Thousand/uL      Absolute Lymphocytes 0.78 Thousands/µL      Absolute Monocytes 0.45 Thousand/µL      Eosinophils Absolute 0.18 Thousand/µL      Basophils Absolute 0.02 Thousands/µL             CT abdomen pelvis with contrast   Final Interpretation by Blas Hsieh MD (01/30 1904)      1.  Circumferential wall thickening of the urinary bladder. Diagnostic considerations include infectious/inflammatory cystitis, bladder outlet obstruction, neurogenic bladder, and malignancy.   2.  Mild bilateral hydronephroureter down to the urinary bladder.   3.  Prostatomegaly.   4.  3.7 cm vesicolith. Nonobstructing right renal stones.   5.  Cholelithiasis.         Workstation performed: [id number]         US scrotum and testicles   Final Interpretation by Blas Hsieh MD (01/30 1847)   No evidence of testicular torsion or epididymoorchitis.   Small, noncomplex left-sided hydrocele.   Small bilateral varicocele.      Workstation performed: [id number]             Procedures    ED Medication and Procedure Management   Prior to Admission Medications   Prescriptions Last Dose Informant Patient Reported? Taking?   Multiple Vitamin (MULTIVITAMIN) tablet   Yes No   Sig: Take 1 tablet by mouth daily   omeprazole (PriLOSEC) 40 MG capsule   No No   Sig: Take 1 capsule (40 mg total) by mouth 2 (two) times a day   senna (SENOKOT) 8.6 MG tablet    Yes No   Sig: Take 1 tablet by mouth daily      Facility-Administered Medications: None     Patient's Medications   Discharge Prescriptions    CEFUROXIME (CEFTIN) 500 MG TABLET    Take 1 tablet (500 mg total) by mouth every 12 (twelve) hours for 10 days Do not start before January 31, 2025.       Start Date: 1/31/2025 End Date: 2/10/2025       Order Dose: 500 mg       Quantity: 20 tablet    Refills: 0    MORPHINE (MSIR) 15 MG TABLET    Take 0.5 tablets (7.5 mg total) by mouth every 4 (four) hours as needed for severe pain Max Daily Amount: 45 mg       Start Date: 1/30/2025 End Date: --       Order Dose: 7.5 mg       Quantity: 8 tablet    Refills: 0    PHENAZOPYRIDINE (PYRIDIUM) 200 MG TABLET    Take 1 tablet (200 mg total) by mouth 3 (three) times a day as needed for bladder spasms       Start Date: 1/30/2025 End Date: --       Order Dose: 200 mg       Quantity: 6 tablet    Refills: 0     No discharge procedures on file.  ED SEPSIS DOCUMENTATION   Time reflects when diagnosis was documented in both MDM as applicable and the Disposition within this note       Time User Action Codes Description Comment    1/30/2025  8:24 PM Abril Lopez [N30.00] Acute cystitis     1/30/2025  8:24 PM Abril Lopez [R31.9] Hematuria     1/30/2025  8:24 PM Abril Lopez Add [N21.0] Bladder stone     1/30/2025  8:24 PM Abril Lopez Add [R10.30] Lower abdominal pain     1/30/2025  8:25 PM Abril Lopez Add [N40.0] BPH (benign prostatic hyperplasia)                  Abril Lopez DO  01/30/25 2028

## 2025-01-30 NOTE — TELEPHONE ENCOUNTER
New Patient    Appointment Scheduling  What office location does the patient prefer? [place]  What is the reason for the patient's appointment? NP- enlarged prostate and elevated PSA of 31.73   Have patient records been requested? N/A  If No, are the records showing in Epic: Yes    Appointment Details  Date: 1/31/25  Time: 940am     Location: [place]    Provider: ELVIE Hernandez  Does the appointment need further review? (Reason) No      HISTORY  Is the patient having active symptoms? If so, describe symptoms:  Has the patient had any previous Urologist(s)? Yes, [facility]  Was the patient seen in the ED? No  Has the patient had any outside testing done? No  Does patient have Imaging/Lab Results: Yes   Does the patient have a personal history of any cancer? No    INSURANCE   Have you confirmed Patient's insurance? Yes  Is the insurance accepted? Yes  Is the insurance active? Yes

## 2025-01-31 ENCOUNTER — OFFICE VISIT (OUTPATIENT)
Dept: UROLOGY | Facility: CLINIC | Age: 60
End: 2025-01-31
Payer: COMMERCIAL

## 2025-01-31 VITALS
TEMPERATURE: 96.4 F | SYSTOLIC BLOOD PRESSURE: 132 MMHG | WEIGHT: 194 LBS | DIASTOLIC BLOOD PRESSURE: 74 MMHG | HEIGHT: 73 IN | HEART RATE: 74 BPM | BODY MASS INDEX: 25.71 KG/M2 | OXYGEN SATURATION: 98 %

## 2025-01-31 DIAGNOSIS — N13.39 OTHER HYDRONEPHROSIS: ICD-10-CM

## 2025-01-31 DIAGNOSIS — N13.8 BENIGN PROSTATIC HYPERPLASIA WITH URINARY OBSTRUCTION: ICD-10-CM

## 2025-01-31 DIAGNOSIS — R31.0 GROSS HEMATURIA: ICD-10-CM

## 2025-01-31 DIAGNOSIS — R97.20 ELEVATED PSA: Primary | ICD-10-CM

## 2025-01-31 DIAGNOSIS — N40.1 BENIGN PROSTATIC HYPERPLASIA WITH URINARY OBSTRUCTION: ICD-10-CM

## 2025-01-31 DIAGNOSIS — N40.1 BENIGN PROSTATIC HYPERPLASIA WITH LOWER URINARY TRACT SYMPTOMS, SYMPTOM DETAILS UNSPECIFIED: ICD-10-CM

## 2025-01-31 LAB
BACTERIA UR CULT: NORMAL
POST-VOID RESIDUAL VOLUME, ML POC: 130 ML

## 2025-01-31 PROCEDURE — 99204 OFFICE O/P NEW MOD 45 MIN: CPT | Performed by: PHYSICIAN ASSISTANT

## 2025-01-31 PROCEDURE — 51798 US URINE CAPACITY MEASURE: CPT | Performed by: PHYSICIAN ASSISTANT

## 2025-01-31 RX ORDER — FINASTERIDE 5 MG/1
5 TABLET, FILM COATED ORAL DAILY
Qty: 90 TABLET | Refills: 1 | Status: SHIPPED | OUTPATIENT
Start: 2025-01-31

## 2025-01-31 RX ORDER — TAMSULOSIN HYDROCHLORIDE 0.4 MG/1
1 CAPSULE ORAL DAILY
COMMUNITY
Start: 2025-01-28

## 2025-01-31 NOTE — QUICK NOTE
Urology on-call    Received secure chat message about patient who presented to the emergency department with dysuria, increased urinary frequency and urgency over the past several days as well as testicular pain and bilateral lower abdominal pain.  He also noted hematuria approximately 2 weeks ago that lasted approximately 1 week.  He has a history of BPH on Flomax and elevated PSA with prior negative prostate biopsy has follow-up with [facility] urology in the past, last seen by them October 2024.    Urine microscopic: 2-4 WBC with occasional bacteria  ultrasound of the scrotum and testes which was unremarkable other than hydrocele and varicocele. CT contrast: Circumferential wall thickening of the urinary bladder. Mild bilateral hydronephroureter down to the urinary bladder any obstructing calculi.  prostatomegaly.3.7 cm vesicolith. Nonobstructing right renal stones.    Creatinine 0.95  WBC 4.47  Bladder scan 37 mL   Afebrile, vital signs stable    Plan:  Since patient is afebrile with stable vital signs and labs are unremarkable can be discharged home with antibiotics (Keflex)  and follow-up with urology as scheduled 1/31  Mild hydronephrosis likely secondary to chronic bladder outlet obstruction in the setting of normal labs with no flank pain  Patient will require cystoscopy as outpatient for hematuria, bladder outlet obstruction evaluation

## 2025-01-31 NOTE — PROGRESS NOTES
Name: Ethan Villarreal      : 1965      MRN: [id number]  Encounter Provider: Rain Priest PA-C  Encounter Date: 2025   Encounter department: [facility] [place]  :  Assessment & Plan  Elevated PSA  - S/p negative prostate biopsy with Dr. Herrera of [facility] on 3/20/24  - Most recent PSA from 10/22/24 was 31.73. Other PSAs on file: 18.86 (1/15/24), 6.26 (22)  - RICK today large prostate, no nodularity  - Repeat PSA at an interval.   - Obtain prostate MRI for further evaluation given very high PSA to be utilized for fusion guided prostate biopsy if concerning lesion.        Gross hematuria  - UA micro from 25: 1-2 RBC, 2-4 WBC, occasional bacteria. Urine culture in process. Currently receiving Ceftin for presumed UTI  - Recommend repeat cystoscopy for further evaluation of hematuria and outlet obstruction. He is agreeable. Prior cystoscopy with Dr. Herrera (2024) noted to have bladder stone and urethral stricture       Benign prostatic hyperplasia with urinary obstruction  - CT from 25 - Circumferential wall thickening of the urinary bladder. Mild bilateral hydronephroureter down to the urinary bladder. Prostatomegaly. 3.7 cm vesicolith. Nonobstructing right renal stones.   - Cr 0.95, GFR  87   - PVR today 130 mL   - Continue Flomax and recommend timed voiding, double voiding   - Start finasteride  - Recommend further work-up with cystoscopy which he is agreeable to   - Obtain follow up renal bladder US in a couple weeks to assess for hydronephrosis resolution.        Follow up with me for imaging review. Follow up with surgeon for cystoscopy     History of Present Illness   Ethan Villarreal is a 59 y.o. male who presents for evaluation of BPH, gross hematuria and elevated PSA. He has been following with Dr. Herrera at [facility]. He was seen at [facility] ED yesterday with frequency, dysuria, difficulties urinating and gross hematuria. Bladder scan of 37 cc. CT  "and urine testing findings as above. He was placed on Ceftin. He reports symptoms have improved. He denies any prior  surgical manipulation. No symptoms currently. No family history of  malignancy.       Review of Systems   Constitutional:  Negative for chills and fever.   Respiratory:  Negative for shortness of breath.    Cardiovascular:  Negative for chest pain.   Gastrointestinal:  Negative for abdominal pain.   Genitourinary:  Positive for difficulty urinating, frequency and hematuria. Negative for dysuria, flank pain and urgency.   Neurological:  Negative for dizziness.          Objective   There were no vitals taken for this visit.    Physical Exam  Constitutional:       Appearance: Normal appearance.   HENT:      Head: Normocephalic and atraumatic.      Right Ear: External ear normal.      Left Ear: External ear normal.      Nose: Nose normal.   Eyes:      General: No scleral icterus.     Conjunctiva/sclera: Conjunctivae normal.   Cardiovascular:      Pulses: Normal pulses.   Pulmonary:      Effort: Pulmonary effort is normal.   Genitourinary:     Prostate: Enlarged. Not tender and no nodules present.   Musculoskeletal:         General: Normal range of motion.      Cervical back: Normal range of motion.   Skin:     General: Skin is warm and dry.   Neurological:      General: No focal deficit present.      Mental Status: He is alert and oriented to person, place, and time.   Psychiatric:         Mood and Affect: Mood normal.         Behavior: Behavior normal.         Thought Content: Thought content normal.         Judgment: Judgment normal.          Results  No results found for: \"PSA\"  Lab Results   Component Value Date    CALCIUM 9.3 01/30/2025    K 4.3 01/30/2025    CO2 31 01/30/2025     01/30/2025    BUN 9 01/30/2025    CREATININE 0.95 01/30/2025     Lab Results   Component Value Date    WBC 4.47 01/30/2025    HGB 15.5 01/30/2025    HCT 46.6 01/30/2025    MCV 91 01/30/2025     01/30/2025 "       Office Urine Dip  No results found for this or any previous visit (from the past hour).]

## 2025-02-21 ENCOUNTER — HOSPITAL ENCOUNTER (OUTPATIENT)
Dept: ULTRASOUND IMAGING | Facility: HOSPITAL | Age: 60
End: 2025-02-21
Payer: COMMERCIAL

## 2025-02-21 DIAGNOSIS — N13.8 BENIGN PROSTATIC HYPERPLASIA WITH URINARY OBSTRUCTION: ICD-10-CM

## 2025-02-21 DIAGNOSIS — N13.39 OTHER HYDRONEPHROSIS: ICD-10-CM

## 2025-02-21 DIAGNOSIS — N40.1 BENIGN PROSTATIC HYPERPLASIA WITH URINARY OBSTRUCTION: ICD-10-CM

## 2025-02-21 PROCEDURE — 76770 US EXAM ABDO BACK WALL COMP: CPT

## 2025-02-26 ENCOUNTER — TELEPHONE (OUTPATIENT)
Age: 60
End: 2025-02-26

## 2025-02-26 ENCOUNTER — RESULTS FOLLOW-UP (OUTPATIENT)
Dept: UROLOGY | Facility: CLINIC | Age: 60
End: 2025-02-26

## 2025-02-26 ENCOUNTER — HOSPITAL ENCOUNTER (OUTPATIENT)
Dept: RADIOLOGY | Age: 60
Discharge: HOME | End: 2025-02-26
Payer: COMMERCIAL

## 2025-02-26 DIAGNOSIS — R97.20 ELEVATED PSA: ICD-10-CM

## 2025-02-26 PROCEDURE — 72197 MRI PELVIS W/O & W/DYE: CPT

## 2025-02-26 PROCEDURE — A9585 GADOBUTROL INJECTION: HCPCS | Performed by: PHYSICIAN ASSISTANT

## 2025-02-26 PROCEDURE — 76377 3D RENDER W/INTRP POSTPROCES: CPT

## 2025-02-26 RX ORDER — GADOBUTROL 604.72 MG/ML
9 INJECTION INTRAVENOUS
Status: COMPLETED | OUTPATIENT
Start: 2025-02-26 | End: 2025-02-26

## 2025-02-26 RX ADMIN — GADOBUTROL 9 ML: 604.72 INJECTION INTRAVENOUS at 08:56

## 2025-02-26 NOTE — TELEPHONE ENCOUNTER
Last cystoscopy was over a year ago. For recent gross hematuria and for BPH surgery options, need repeat cystoscopy with our team

## 2025-02-26 NOTE — TELEPHONE ENCOUNTER
Called and spoke to pt. Rain's message relayed. Offered to put him back on the schedule for today, pt declined and stated he did not want to get a cystoscopy today. Explained to pt that the next available cysto is in the summer, pt rescheduled for 7/25 at 1130.

## 2025-02-26 NOTE — TELEPHONE ENCOUNTER
Pt called and stated he had a cystoscopy completed with [facility], he is questioning if the results can just be reviewed by the provider instead of repeating the procedure. Pt requested to cancel his 2/26/25 cystoscopy and to be contacted directly after the provider reviews the previous results. Please advise.     Call back: [phone]

## 2025-02-28 ENCOUNTER — RESULTS FOLLOW-UP (OUTPATIENT)
Dept: UROLOGY | Facility: CLINIC | Age: 60
End: 2025-02-28

## 2025-02-28 DIAGNOSIS — R97.20 ELEVATED PSA: Primary | ICD-10-CM

## 2025-03-11 NOTE — PROGRESS NOTES
Name: Ethan Villarreal      : 1965      MRN: [id number]  Encounter Provider: Rain Priest PA-C  Encounter Date: 3/12/2025   Encounter department: [facility] [place]  :  Assessment & Plan  Elevated PSA  - S/p negative prostate biopsy with Dr. Herrera of [facility] on 3/20/24  - Most recent PSA from 10/22/24 was 31.73. Other PSAs on file: 18.86 (1/15/24), 6.26 (22)  - RICK  large prostate, no nodularity  - Prostate MRI from 25 - PIRADS 2, marked BPH with calculated prostate volume of 102 mL. Large lamellated urinary bladder calculus measuring 3.8 x 1.7 x 3.1 cm, similar to prior CT.   - Obtain repeat PSA as ordered. Advised if PSA density remains high, may need prostate biopsy prior to outlet surgery however would defer to attending for recommendations.   Orders:    POCT Measure PVR    Gross hematuria  - CT from 25 - Circumferential wall thickening of the urinary bladder. Mild bilateral hydronephroureter down to the urinary bladder. Prostatomegaly. 3.7 cm vesicolith. Nonobstructing right renal stones.   - US kidney bladder with PVR from 25 - Nonobstructive right renal calculi. Redemonstrated large bladder calculus.  Mild prostatic enlargement and protrusion into the bladder. Post void residual of 85 mL. No hydronephrosis.   -Recommend repeat cystoscopy for further evaluation of hematuria and outlet obstruction as scheduled in July. Prior cystoscopy with Dr. Herrera (2024) noted to have bladder stone and urethral stricture   Orders:    POCT Measure PVR    Benign prostatic hyperplasia with lower urinary tract symptoms, symptom details unspecified  - Continue Flomax and recommend timed voiding, double voiding. Started on finasteride on 25   - PVR today 15 mL   Orders:    POCT Measure PVR    tamsulosin (FLOMAX) 0.4 mg; Take 1 capsule (0.4 mg total) by mouth daily with dinner    Bladder calculus             History of Present Illness   Ethan Villarreal is a 59 y.o.  "male who presents for follow up elevated PSA, BPH, and gross hematuria. He was seen at [facility] ED in January with frequency, dysuria, difficulties urinating and gross hematuria. Bladder scan of 37 cc. He was treated for UTI. CT showed above findings. Follow up US shows resolution of hydronephrosis.  He also has a very elevated PSA. Prior negative prostate biopsy last year. Recent prostate MRI negative. He denies any recent episodes of hematuria. Does report continued intermittent difficulties with urinating.       Review of Systems   Constitutional:  Negative for chills and fever.   Respiratory:  Negative for shortness of breath.    Cardiovascular:  Negative for chest pain.   Gastrointestinal:  Negative for abdominal pain.   Genitourinary:  Positive for difficulty urinating. Negative for dysuria, flank pain, frequency, hematuria and urgency.   Neurological:  Negative for dizziness.          Objective   There were no vitals taken for this visit.    Physical Exam  Constitutional:       Appearance: Normal appearance.   HENT:      Head: Normocephalic and atraumatic.      Right Ear: External ear normal.      Left Ear: External ear normal.      Nose: Nose normal.   Eyes:      General: No scleral icterus.     Conjunctiva/sclera: Conjunctivae normal.   Cardiovascular:      Pulses: Normal pulses.   Pulmonary:      Effort: Pulmonary effort is normal.   Musculoskeletal:         General: Normal range of motion.      Cervical back: Normal range of motion.   Neurological:      General: No focal deficit present.      Mental Status: He is alert and oriented to person, place, and time.   Psychiatric:         Mood and Affect: Mood normal.         Behavior: Behavior normal.         Thought Content: Thought content normal.         Judgment: Judgment normal.          Results   No results found for: \"PSA\"  Lab Results   Component Value Date    CALCIUM 9.3 01/30/2025    K 4.3 01/30/2025    CO2 31 01/30/2025     01/30/2025 "    BUN 9 01/30/2025    CREATININE 0.95 01/30/2025     Lab Results   Component Value Date    WBC 4.47 01/30/2025    HGB 15.5 01/30/2025    HCT 46.6 01/30/2025    MCV 91 01/30/2025     01/30/2025       Office Urine Dip  No results found for this or any previous visit (from the past hour).

## 2025-03-12 ENCOUNTER — OFFICE VISIT (OUTPATIENT)
Dept: UROLOGY | Facility: CLINIC | Age: 60
End: 2025-03-12
Payer: COMMERCIAL

## 2025-03-12 ENCOUNTER — APPOINTMENT (OUTPATIENT)
Dept: LAB | Facility: HOSPITAL | Age: 60
End: 2025-03-12
Payer: COMMERCIAL

## 2025-03-12 VITALS
HEART RATE: 67 BPM | HEIGHT: 73 IN | RESPIRATION RATE: 18 BRPM | OXYGEN SATURATION: 97 % | SYSTOLIC BLOOD PRESSURE: 142 MMHG | BODY MASS INDEX: 26.06 KG/M2 | DIASTOLIC BLOOD PRESSURE: 80 MMHG | WEIGHT: 196.6 LBS | TEMPERATURE: 97.5 F

## 2025-03-12 DIAGNOSIS — R31.0 GROSS HEMATURIA: ICD-10-CM

## 2025-03-12 DIAGNOSIS — R97.20 ELEVATED PSA: Primary | ICD-10-CM

## 2025-03-12 DIAGNOSIS — N21.0 BLADDER CALCULUS: ICD-10-CM

## 2025-03-12 DIAGNOSIS — R97.20 ELEVATED PSA: ICD-10-CM

## 2025-03-12 DIAGNOSIS — N40.1 BENIGN PROSTATIC HYPERPLASIA WITH LOWER URINARY TRACT SYMPTOMS, SYMPTOM DETAILS UNSPECIFIED: ICD-10-CM

## 2025-03-12 LAB
POST-VOID RESIDUAL VOLUME, ML POC: 15 ML
PSA SERPL-MCNC: 30.26 NG/ML (ref 0–4)

## 2025-03-12 PROCEDURE — 51798 US URINE CAPACITY MEASURE: CPT | Performed by: PHYSICIAN ASSISTANT

## 2025-03-12 PROCEDURE — 84153 ASSAY OF PSA TOTAL: CPT

## 2025-03-12 PROCEDURE — 36415 COLL VENOUS BLD VENIPUNCTURE: CPT

## 2025-03-12 PROCEDURE — 99214 OFFICE O/P EST MOD 30 MIN: CPT | Performed by: PHYSICIAN ASSISTANT

## 2025-03-12 RX ORDER — TAMSULOSIN HYDROCHLORIDE 0.4 MG/1
0.4 CAPSULE ORAL
Qty: 90 CAPSULE | Refills: 3 | Status: SHIPPED | OUTPATIENT
Start: 2025-03-12

## 2025-03-13 NOTE — TELEPHONE ENCOUNTER
Called and spoke to pt- he is scheduled for 6/13 fro prostate bx and cysto, and has a review f/u of 7/8 at 7:30am    Pt confirmed the appt  Will send bx information in the mail, and will monitor for a sooner results appt for the pt as this one is a month after.

## 2025-03-13 NOTE — TELEPHONE ENCOUNTER
----- Message from Rain Priest PA-C sent at 3/13/2025 11:57 AM EDT -----  Called patient regarding PSA results. Recommendations would be for TRUS prostate biopsy at time of scheduled cystoscopy. Please adjust appointment to include this/abx injection. Fleet enema sent to pharmacy. Please see if any sooner availability.

## 2025-04-09 NOTE — TELEPHONE ENCOUNTER
----- Message from Yazmin Garcia sent at 3/13/2025 12:51 PM EDT -----  Monitor for sooner OLV in [facility]

## 2025-06-02 ENCOUNTER — APPOINTMENT (EMERGENCY)
Dept: CT IMAGING | Facility: HOSPITAL | Age: 60
End: 2025-06-02
Payer: COMMERCIAL

## 2025-06-02 ENCOUNTER — HOSPITAL ENCOUNTER (EMERGENCY)
Facility: HOSPITAL | Age: 60
Discharge: HOME | End: 2025-06-02
Admitting: EMERGENCY MEDICINE
Payer: COMMERCIAL

## 2025-06-02 VITALS
HEART RATE: 85 BPM | DIASTOLIC BLOOD PRESSURE: 66 MMHG | SYSTOLIC BLOOD PRESSURE: 124 MMHG | OXYGEN SATURATION: 100 % | RESPIRATION RATE: 17 BRPM | TEMPERATURE: 97.9 F

## 2025-06-02 DIAGNOSIS — R39.9 LOWER URINARY TRACT SYMPTOMS (LUTS): ICD-10-CM

## 2025-06-02 DIAGNOSIS — R31.9 HEMATURIA: Primary | ICD-10-CM

## 2025-06-02 DIAGNOSIS — N21.0 BLADDER STONE: ICD-10-CM

## 2025-06-02 DIAGNOSIS — N30.00 ACUTE CYSTITIS: ICD-10-CM

## 2025-06-02 LAB
ANION GAP SERPL CALCULATED.3IONS-SCNC: 4 MMOL/L (ref 4–13)
BACTERIA UR QL AUTO: ABNORMAL /HPF
BASOPHILS # BLD AUTO: 0.02 THOUSANDS/ÂΜL (ref 0–0.1)
BASOPHILS NFR BLD AUTO: 1 % (ref 0–1)
BILIRUB UR QL STRIP: NEGATIVE
BUN SERPL-MCNC: 10 MG/DL (ref 5–25)
CALCIUM SERPL-MCNC: 9.3 MG/DL (ref 8.4–10.2)
CHLORIDE SERPL-SCNC: 106 MMOL/L (ref 96–108)
CLARITY UR: ABNORMAL
CO2 SERPL-SCNC: 29 MMOL/L (ref 21–32)
COLOR UR: YELLOW
CREAT SERPL-MCNC: 0.87 MG/DL (ref 0.6–1.3)
EOSINOPHIL # BLD AUTO: 0.2 THOUSAND/ÂΜL (ref 0–0.61)
EOSINOPHIL NFR BLD AUTO: 5 % (ref 0–6)
ERYTHROCYTE [DISTWIDTH] IN BLOOD BY AUTOMATED COUNT: 13.2 % (ref 11.6–15.1)
GFR SERPL CREATININE-BSD FRML MDRD: 94 ML/MIN/1.73SQ M
GLUCOSE SERPL-MCNC: 102 MG/DL (ref 65–140)
GLUCOSE UR STRIP-MCNC: NEGATIVE MG/DL
HCT VFR BLD AUTO: 45.8 % (ref 36.5–49.3)
HGB BLD-MCNC: 15.3 G/DL (ref 12–17)
HGB UR QL STRIP.AUTO: ABNORMAL
HYALINE CASTS #/AREA URNS LPF: ABNORMAL /LPF
IMM GRANULOCYTES # BLD AUTO: 0.01 THOUSAND/UL (ref 0–0.2)
IMM GRANULOCYTES NFR BLD AUTO: 0 % (ref 0–2)
KETONES UR STRIP-MCNC: NEGATIVE MG/DL
LEUKOCYTE ESTERASE UR QL STRIP: ABNORMAL
LYMPHOCYTES # BLD AUTO: 0.75 THOUSANDS/ÂΜL (ref 0.6–4.47)
LYMPHOCYTES NFR BLD AUTO: 17 % (ref 14–44)
MCH RBC QN AUTO: 30.5 PG (ref 26.8–34.3)
MCHC RBC AUTO-ENTMCNC: 33.4 G/DL (ref 31.4–37.4)
MCV RBC AUTO: 91 FL (ref 82–98)
MONOCYTES # BLD AUTO: 0.46 THOUSAND/ÂΜL (ref 0.17–1.22)
MONOCYTES NFR BLD AUTO: 11 % (ref 4–12)
MUCOUS THREADS UR QL AUTO: ABNORMAL
NEUTROPHILS # BLD AUTO: 2.94 THOUSANDS/ÂΜL (ref 1.85–7.62)
NEUTS SEG NFR BLD AUTO: 66 % (ref 43–75)
NITRITE UR QL STRIP: NEGATIVE
NON-SQ EPI CELLS URNS QL MICRO: ABNORMAL /HPF
NRBC BLD AUTO-RTO: 0 /100 WBCS
PH UR STRIP.AUTO: 6 [PH]
PLATELET # BLD AUTO: 141 THOUSANDS/UL (ref 149–390)
PMV BLD AUTO: 10.8 FL (ref 8.9–12.7)
POTASSIUM SERPL-SCNC: 4 MMOL/L (ref 3.5–5.3)
PROT UR STRIP-MCNC: ABNORMAL MG/DL
RBC # BLD AUTO: 5.02 MILLION/UL (ref 3.88–5.62)
RBC #/AREA URNS AUTO: ABNORMAL /HPF
SODIUM SERPL-SCNC: 139 MMOL/L (ref 135–147)
SP GR UR STRIP.AUTO: 1.02 (ref 1–1.03)
UROBILINOGEN UR STRIP-ACNC: <2 MG/DL
WBC # BLD AUTO: 4.38 THOUSAND/UL (ref 4.31–10.16)
WBC #/AREA URNS AUTO: ABNORMAL /HPF

## 2025-06-02 PROCEDURE — 36415 COLL VENOUS BLD VENIPUNCTURE: CPT | Performed by: PHYSICIAN ASSISTANT

## 2025-06-02 PROCEDURE — 85025 COMPLETE CBC W/AUTO DIFF WBC: CPT | Performed by: PHYSICIAN ASSISTANT

## 2025-06-02 PROCEDURE — 99284 EMERGENCY DEPT VISIT MOD MDM: CPT

## 2025-06-02 PROCEDURE — 96365 THER/PROPH/DIAG IV INF INIT: CPT

## 2025-06-02 PROCEDURE — 81001 URINALYSIS AUTO W/SCOPE: CPT | Performed by: PHYSICIAN ASSISTANT

## 2025-06-02 PROCEDURE — 74176 CT ABD & PELVIS W/O CONTRAST: CPT

## 2025-06-02 PROCEDURE — 87086 URINE CULTURE/COLONY COUNT: CPT | Performed by: PHYSICIAN ASSISTANT

## 2025-06-02 PROCEDURE — 99285 EMERGENCY DEPT VISIT HI MDM: CPT | Performed by: PHYSICIAN ASSISTANT

## 2025-06-02 PROCEDURE — 96375 TX/PRO/DX INJ NEW DRUG ADDON: CPT

## 2025-06-02 PROCEDURE — 96361 HYDRATE IV INFUSION ADD-ON: CPT

## 2025-06-02 PROCEDURE — 80048 BASIC METABOLIC PNL TOTAL CA: CPT | Performed by: PHYSICIAN ASSISTANT

## 2025-06-02 RX ORDER — CEPHALEXIN 500 MG/1
500 CAPSULE ORAL 3 TIMES DAILY
Qty: 21 CAPSULE | Refills: 0 | Status: SHIPPED | OUTPATIENT
Start: 2025-06-02 | End: 2025-06-09

## 2025-06-02 RX ORDER — PHENAZOPYRIDINE HYDROCHLORIDE 200 MG/1
200 TABLET, FILM COATED ORAL 3 TIMES DAILY
Qty: 12 TABLET | Refills: 0 | Status: SHIPPED | OUTPATIENT
Start: 2025-06-02

## 2025-06-02 RX ORDER — OXYBUTYNIN CHLORIDE 5 MG/1
5 TABLET ORAL 3 TIMES DAILY
Status: DISCONTINUED | OUTPATIENT
Start: 2025-06-02 | End: 2025-06-02 | Stop reason: HOSPADM

## 2025-06-02 RX ORDER — MORPHINE SULFATE 4 MG/ML
4 INJECTION, SOLUTION INTRAMUSCULAR; INTRAVENOUS ONCE
Status: COMPLETED | OUTPATIENT
Start: 2025-06-02 | End: 2025-06-02

## 2025-06-02 RX ORDER — PHENAZOPYRIDINE HYDROCHLORIDE 100 MG/1
100 TABLET, FILM COATED ORAL ONCE
Status: COMPLETED | OUTPATIENT
Start: 2025-06-02 | End: 2025-06-02

## 2025-06-02 RX ORDER — KETOROLAC TROMETHAMINE 30 MG/ML
30 INJECTION, SOLUTION INTRAMUSCULAR; INTRAVENOUS ONCE
Status: COMPLETED | OUTPATIENT
Start: 2025-06-02 | End: 2025-06-02

## 2025-06-02 RX ORDER — OXYBUTYNIN CHLORIDE 5 MG/1
5 TABLET, EXTENDED RELEASE ORAL DAILY
Qty: 10 TABLET | Refills: 0 | Status: SHIPPED | OUTPATIENT
Start: 2025-06-02

## 2025-06-02 RX ORDER — ONDANSETRON 2 MG/ML
4 INJECTION INTRAMUSCULAR; INTRAVENOUS ONCE
Status: COMPLETED | OUTPATIENT
Start: 2025-06-02 | End: 2025-06-02

## 2025-06-02 RX ADMIN — ONDANSETRON 4 MG: 2 INJECTION INTRAMUSCULAR; INTRAVENOUS at 08:35

## 2025-06-02 RX ADMIN — OXYBUTYNIN CHLORIDE 5 MG: 5 TABLET ORAL at 11:42

## 2025-06-02 RX ADMIN — CEFEPIME 2000 MG: 2 INJECTION, POWDER, FOR SOLUTION INTRAVENOUS at 11:42

## 2025-06-02 RX ADMIN — KETOROLAC TROMETHAMINE 30 MG: 30 INJECTION, SOLUTION INTRAMUSCULAR at 11:43

## 2025-06-02 RX ADMIN — MORPHINE SULFATE 4 MG: 4 INJECTION INTRAVENOUS at 08:36

## 2025-06-02 RX ADMIN — SODIUM CHLORIDE 500 ML: 0.9 INJECTION, SOLUTION INTRAVENOUS at 08:53

## 2025-06-02 RX ADMIN — PHENAZOPYRIDINE 100 MG: 100 TABLET ORAL at 11:43

## 2025-06-02 NOTE — DISCHARGE INSTRUCTIONS
"Patient Education     Urinary tract infection - Discharge instructions   The Basics   Written by the doctors and editors at [facility]   What are discharge instructions? -- Discharge instructions are information about how to take care of yourself after getting medical care for a health problem.  What is a urinary tract infection? -- A urinary tract infection (\"UTI\") is an infection that affects either the bladder or the kidneys (figure 1). A kidney infection is more serious, and can lead to other serious problems if it is not treated properly.  You need to take antibiotics to treat a UTI. It is important to take all of your antibiotics, even if you start to feel better.  How do I care for myself at home? -- Ask the doctor or nurse what you should do when you go home. Make sure that you understand exactly what you need to do to care for yourself. Ask questions if there is anything you do not understand.  You should also:   Take all of your medicines as instructed.   Take phenazopyridine (sample brand name: AZO Urinary Pain Relief) for the first day or so, if you choose. This is an over-the-counter medicine. It will help numb your bladder and decrease the urge to urinate. This medicine causes your urine and tears to look orange.   Take acetaminophen (sample brand name: Tylenol) if needed for pain.   Drink extra fluids. This can help prevent more bladder infections. If you have sex, these things might also help:   Urinate right afterward.   If you use birth control, use a form that does not contain spermicide.  When should I call the doctor? -- Call for advice if:   You have pain in your back, shoulder, or belly.   You have a fever, shaking chills, or sweats even though you are taking antibiotics.   You notice more blood in your urine.   Your symptoms get worse or do not get better within 24 hours of starting antibiotics.   Your symptoms come back after finishing treatment.   You have any new or worrying symptoms.  All " topics are updated as new evidence becomes available and our peer review process is complete.  This topic retrieved from [employer] on: Feb 26, 2024.  Topic 486920 Version 1.0  Release: 32.2.4 - C32.56  © 2024 UpToDate, Inc. and/or its affiliates. All rights reserved.  figure 1: Anatomy of the urinary tract     Urine is made by the kidneys. It passes from the kidneys into the bladder through 2 tubes called the ureters. Then, it leaves the bladder through another tube called the urethra.  Graphic 52523 Version 8.0  Consumer Information Use and Disclaimer   Disclaimer: This generalized information is a limited summary of diagnosis, treatment, and/or medication information. It is not meant to be comprehensive and should be used as a tool to help the user understand and/or assess potential diagnostic and treatment options. It does NOT include all information about conditions, treatments, medications, side effects, or risks that may apply to a specific patient. It is not intended to be medical advice or a substitute for the medical advice, diagnosis, or treatment of a health care provider based on the health care provider's examination and assessment of a patient's specific and unique circumstances. Patients must speak with a health care provider for complete information about their health, medical questions, and treatment options, including any risks or benefits regarding use of medications. This information does not endorse any treatments or medications as safe, effective, or approved for treating a specific patient. UpToDate, Inc. and its affiliates disclaim any warranty or liability relating to this information or the use thereof.The use of this information is governed by the Terms of Use, available at https://www.wolterskluwer.com/en/know/clinical-effectiveness-terms. 2024© UpToDate, Inc. and its affiliates and/or licensors. All rights reserved.  Copyright   © 2024 UpToDate, Inc. and/or its affiliates. All rights  "reserved.  Patient Education     Blood in the urine (hematuria) in adults   The Basics   Written by the doctors and editors at [facility]   Should I be worried if there is blood in my urine? -- It can be scary to see blood in your urine. But try to stay calm. Blood in the urine is not always serious. Still, you should see a doctor or nurse. The medical term for blood in the urine is \"hematuria.\"  Blood in the urine can come from the kidneys (where urine is made) or from anywhere in the urinary tract (figure 1).  What causes blood in the urine? -- Blood in the urine can be caused by lots of problems, including:   Bladder infection, which also often causes burning or pain when you urinate   Kidney infection, which also often causes back pain and fever   Kidney stones, which also often cause back pain   Certain kidney diseases   Intense exercise   Injury (for example, if you fall off of a bike and bruise a kidney)   Enlargement of the prostate (called \"benign prostatic hyperplasia\"), which is common in older males   Cancer of the bladder, prostate, or kidney (cancer is an uncommon cause of blood in the urine, and it usually affects people older than 50)  Sometimes, urine can look as though it is bloody even though it isn't. This can happen if you eat a lot of beets or food dyes, or if you take certain medicines.  Should I see a doctor or nurse? -- Yes. See your doctor or nurse if you see blood in your urine, or if your urine is pink, red, brownish-red, or the color of tea.  Sometimes, doctors find blood in the urine when they do a routine urine test. That can happen even if the urine looks normal. It means that there are microscopic (trace) amounts of blood in the urine.  Will I need tests? -- Maybe. This depends on your age and symptoms. Not everyone needs tests.  If your doctor or nurse thinks that you need tests, they might include:   Urine tests - These can show what kind of cells are in your urine. This can help the " "doctor find out what's happening. The test might also measure the amount of protein in your urine. Too much protein can be a sign of a kidney problem.   Blood tests - These can show whether your kidneys are working normally, or if you might have certain diseases.   CT scan - This is a special kind of X-ray. It creates a picture of the kidneys and urinary tract. Doctors can use it to check for kidney stones and other problems in the urinary tract.   Kidney ultrasound - This is another way to create a picture of the kidneys. Doctors sometimes use ultrasound instead of a CT scan.   Cystoscopy - This is a procedure that lets the doctor look inside your bladder and urethra. During cystoscopy, a doctor puts a thin tube with a tiny camera on the end into the urethra and moves it up into the bladder. The tube is called a \"cystoscope.\" If the doctor sees anything unusual, they might take a sample of tissue (called a biopsy) to look at under a microscope.   Kidney biopsy - For this test, the doctor takes a small sample of tissue from the kidney to look at under the microscope. The most common way to get the sample is by inserting a needle straight through the skin in the back and into the kidney.   Blood pressure - Your blood pressure will probably be measured. That's because high blood pressure can be a sign of certain kidney problems.  Will I need treatment? -- It depends on what caused the blood in your urine. If you had blood in your urine because you exercised too intensely or because your kidney was bruised, you might not need any treatment. But if you have blood in the urine because of a bladder or kidney infection, you will probably need antibiotics.  What problems should I watch for? -- Call your doctor or nurse for advice if:   You have signs of an infection - These include a fever of 100.4°F (38°C) or higher and chills, or pain when urinating.   You have very bad pain in your back or side.   You can't urinate at " all, or only a small amount of urine comes out.   You have blood clots in your urine.   Your symptoms are not getting better after a few days or are getting worse.  All topics are updated as new evidence becomes available and our peer review process is complete.  This topic retrieved from [employer] on: May 09, 2024.  Topic 95283 Version 18.0  Release: 32.4.3 - C32.128  © 2024 UpToDate, Inc. and/or its affiliates. All rights reserved.  figure 1: Anatomy of the urinary tract     Urine is made by the kidneys. It passes from the kidneys into the bladder through 2 tubes called the ureters. Then, it leaves the bladder through another tube called the urethra.  Graphic 35646 Version 8.0  Consumer Information Use and Disclaimer   Disclaimer: This generalized information is a limited summary of diagnosis, treatment, and/or medication information. It is not meant to be comprehensive and should be used as a tool to help the user understand and/or assess potential diagnostic and treatment options. It does NOT include all information about conditions, treatments, medications, side effects, or risks that may apply to a specific patient. It is not intended to be medical advice or a substitute for the medical advice, diagnosis, or treatment of a health care provider based on the health care provider's examination and assessment of a patient's specific and unique circumstances. Patients must speak with a health care provider for complete information about their health, medical questions, and treatment options, including any risks or benefits regarding use of medications. This information does not endorse any treatments or medications as safe, effective, or approved for treating a specific patient. UpToDate, Inc. and its affiliates disclaim any warranty or liability relating to this information or the use thereof.The use of this information is governed by the Terms of Use, available at  https://www.woltersTrippin Inuwer.com/en/know/clinical-effectiveness-terms. 2024© [employer] and its affiliates and/or licensors. All rights reserved.  Copyright   © 2024 [employer] and/or its affiliates. All rights reserved.

## 2025-06-02 NOTE — ED PROVIDER NOTES
Time reflects when diagnosis was documented in both MDM as applicable and the Disposition within this note       Time User Action Codes Description Comment    6/2/2025 12:36 PM Trey Le Add [R31.9] Hematuria     6/2/2025 12:37 PM Trey Le Add [N21.0] Bladder stone     6/2/2025 12:37 PM Trey Le Add [N30.00] Acute cystitis     6/2/2025 12:37 PM Trey Le Add [R39.9] Lower urinary tract symptoms (LUTS)           ED Disposition       ED Disposition   Discharge    Condition   Stable    Date/Time   Mon Jun 2, 2025 12:36 PM    Comment   Ethan Villarreal discharge to home/self care.                   Assessment & Plan       Medical Decision Making  [facility] Narrative:  History and Clinical Impression:  60 y.o. female presents to ED for evaluation of worsening hematuria, dysuria and LUTS.  Further details in HPI.    DDx - Based on my history and physical examination:    most likely cystitis, also consider bladder outlet obstruction,  kidney stone, bladder cancer, BPH, prostatitis, pyelonephritis, renal failure, constipation, medication side effect.  Consider but doubt AAA, intraabdominal infection or mass based on history and clinical presentation     Plan:  labs including UA,  Bladder scan for PVR/retention, renal CT for obstructing stone, IV morphine for pain, and reassess.  Plan to contact urology for recommendation given scheduled cystoscopy next week.       Diagnostic Results & Interpretation:    Labs: UA abnormal, positive leukocytes, innumerable RBC and 30-50 WBC.  Concern for infection.   Renal function normal and WBC normal.  No anemia.   Imaging: renal CT abnormal - positive for 4 CM bladder stone, no hydronephrosis or obstructing ureteral stone, and bladder wall thickening which correlates with cystitis.   Bedside procedures: Bladder scan demonstrate 27 ml,  normal.  No retention  Reviewed with patient and SO.  Reviewed history, exam, test results and treatment with urology.  See ED course for  "details     Treatment & Reassessment:    Tx with Morphine 4 mg IV, Toradol 30 mg IV, 2 grams cefepime for cystitis coverage in patient with BPH, bladder stone and LUTS, Oxybutynin and Pyridium.      Patient is improved on reassessment.  alert and oriented x 4, GCS 15.  Heart regular rate and rhythm, lungs clear to auscultation.  Abdominal exam reveals normal bowel sounds x 4, no tenderness. M/S CURRAN x 4, no gross deformity, warm soft and well perfused. Remainder physical exam without acute abnormality.     Final Assessment and Disposition:  (see ED course for additional MDM):   Dx Hematuria, bladder stone, acute cystitis with lower urinary tract symptoms and abnormal CT.  Stable for discharge and outpatient management. Discussed diagnosis, treatment plan, and expectant coarse including cephalexin for antibiotic coverage, Rx for oxybutinin, and pyridium for pain/bladder spasm.  Provided verbal and written instructions to follow up with pcp and recommended specialist    Reviewed reasons to Return to ED.  Patient verbalized understanding of reasons return to the ED. Provided opportunity for questions.  All questions were answered.      Risk Assessment, Medical Decision Making and Disclaimers:     I reviewed the past medical, surgical, and social history, vital signs, nursing notes, and other relevant ancillary testing/information. I had a detailed discussion with the patient regarding the historical points, examination findings, and any diagnostic results    Patient was medically evaluated for potential limb- or life-threatening conditions.  History, clinical evaluation, and diagnostic results were used to narrow differential diagnosis and arrive at a final assessment.       Portions of the record may have been created with voice recognition software. Occasional wrong word or \"sound a like\" substitutions may have occurred due to the inherent limitations of voice recognition software. Read the chart carefully and " recognize, using context, where substitutions have occurred.       Amount and/or Complexity of Data Reviewed  Labs: ordered.  Radiology: ordered.    Risk  Prescription drug management.        ED Course as of 06/02/25 1730   Mon Jun 02, 2025   0954 Reassessment.  Patient reports no relief with morphine.  Still having pain with urination.   Discussed CT, lab and UA results concerning for infection.   Will trial toradol for pain. Patient has cystoscopy scheduled next week, will contact  urology for further recommendations    1101 1030 Text ed urology to review case.  Response pending. .    1112 IV cefepime ordered for UTI/cystitis coverage.     1116 Reviewed patient history, clinical presentation, exam, lab and diagnostic results and treatment with urology. Recommended pyridium and oxybutinin to help relax bladder, consider stone causing him spasms. Do not recommend chahal -  would likely cause more issues with placement/bleeding/irritation. Agreeable to dose of antibiotics for concern for infection and maintain plan for cysto/prostate biopsy next week.  Patient and SO updated on plan at bedside.  Pyridium and oxybutinin ordered.     1234 Ressessment/  pain improved after ED treatment.  Plan discharge.         Medications   sodium chloride 0.9 % bolus 500 mL (0 mL Intravenous Stopped 6/2/25 1145)   morphine injection 4 mg (4 mg Intravenous Given 6/2/25 0836)   ondansetron (ZOFRAN) injection 4 mg (4 mg Intravenous Given 6/2/25 0835)   cefepime (MAXIPIME) 2 g/50 mL dextrose IVPB (0 mg Intravenous Stopped 6/2/25 1242)   ketorolac (TORADOL) injection 30 mg (30 mg Intravenous Given 6/2/25 1143)   phenazopyridine (PYRIDIUM) tablet 100 mg (100 mg Oral Given 6/2/25 1143)       ED Risk Strat Scores                    No data recorded                            History of Present Illness       Chief Complaint   Patient presents with    Blood in Urine     Pt reports hematuria and diff urinating completely that began on Friday, hx  of enlarged prostate.        Past Medical History[1]   Past Surgical History[2]   Family History[3]   Social History[4]   E-Cigarette/Vaping    E-Cigarette Use Never User       E-Cigarette/Vaping Substances    Nicotine No     THC No     CBD No     Flavoring No     Other No     Unknown No       I have reviewed and agree with the history as documented.     60 y.o. male with past medical history significant for hematuria, and BPH with LUTS on Flomax and finasteride presents to ED with chief complaint of hematuria, decreased urine output and pain   Onset of symptoms reported as 3 days ago  Location of symptoms reported as lower urinary tract  Quality is reported as decreased UOP, hematuria and increasing dysuria.   Severity is reported as moderate  Associated symptoms: positive for suprapubic pain, dysuria, and hematuria.  Denies fevers, chills, nausea, vomiting, constipation, flank pain, syncope or rash  Modifying factors: talking finasteride and Flomax without relief.  Urination worsening pain    Context: denies blood thinner use.  No reported fall, injury or trauma.  Had known bladder stone.  Follows with urology, had negative biopsy 3/2024 for elevated PSA, prior cystoscopy notable for bladder stones.  Scheduled for cystoscopy with [facility]rology next week.           Blood in Urine  Irritative symptoms include urgency. Associated symptoms include dysuria. Pertinent negatives include no abdominal pain, chills, fever, flank pain, nausea or vomiting.       Review of Systems   Constitutional:  Negative for chills, diaphoresis, fever and unexpected weight change.   Eyes:  Negative for visual disturbance.   Respiratory:  Negative for chest tightness, shortness of breath and stridor.    Cardiovascular:  Negative for chest pain.   Gastrointestinal:  Negative for abdominal pain, blood in stool, constipation, nausea and vomiting.   Genitourinary:  Positive for decreased urine volume, difficulty urinating, dysuria, hematuria,  penile pain and urgency. Negative for flank pain.   Musculoskeletal:  Negative for gait problem.   Skin:  Negative for rash.   Neurological:  Negative for seizures, syncope, facial asymmetry, weakness and numbness.   All other systems reviewed and are negative.          Objective       ED Triage Vitals   Temperature Pulse Blood Pressure Respirations SpO2 Patient Position - Orthostatic VS   06/02/25 0755 06/02/25 0755 06/02/25 0755 06/02/25 0755 06/02/25 0755 06/02/25 0755   97.9 °F (36.6 °C) 85 124/66 17 100 % Sitting      Temp Source Heart Rate Source BP Location FiO2 (%) Pain Score    06/02/25 0755 06/02/25 0755 06/02/25 0755 -- 06/02/25 0836    Temporal Monitor Left arm  8      Vitals      Date and Time Temp Pulse SpO2 Resp BP Pain Score FACES Pain Rating User   06/02/25 1143 -- -- -- -- -- 7 -- KENROY   06/02/25 0836 -- -- -- -- -- 8 --    06/02/25 0755 97.9 °F (36.6 °C) 85 100 % 17 124/66 -- -- LF            Physical Exam  Vitals and nursing note reviewed.   Constitutional:       General: He is not in acute distress.     Appearance: Normal appearance.   HENT:      Head: Normocephalic and atraumatic.      Right Ear: External ear normal.      Left Ear: External ear normal.      Nose: Nose normal.      Mouth/Throat:      Mouth: Mucous membranes are moist.     Eyes:      General: No scleral icterus.     Extraocular Movements: Extraocular movements intact.      Conjunctiva/sclera: Conjunctivae normal.       Cardiovascular:      Rate and Rhythm: Normal rate and regular rhythm.      Pulses: Normal pulses.   Pulmonary:      Effort: Pulmonary effort is normal.      Breath sounds: Normal breath sounds.   Abdominal:      Palpations: There is no mass.      Tenderness: There is abdominal tenderness (suprapubic tenderness to palpation, no rigidity or peritoneal signs). There is no right CVA tenderness, left CVA tenderness, guarding or rebound.     Musculoskeletal:         General: No tenderness, deformity or signs of injury.       Cervical back: Normal range of motion and neck supple.     Skin:     General: Skin is dry.      Coloration: Skin is not jaundiced.      Findings: No rash.     Neurological:      General: No focal deficit present.      Mental Status: He is alert and oriented to person, place, and time. Mental status is at baseline.     Psychiatric:         Mood and Affect: Mood normal.         Behavior: Behavior normal.         Thought Content: Thought content normal.         Results Reviewed       Procedure Component Value Units Date/Time    Basic metabolic panel [[id number]] Collected: 06/02/25 0834    Lab Status: Final result Specimen: Blood from Arm, Right Updated: 06/02/25 0903     Sodium 139 mmol/L      Potassium 4.0 mmol/L      Chloride 106 mmol/L      CO2 29 mmol/L      ANION GAP 4 mmol/L      BUN 10 mg/dL      Creatinine 0.87 mg/dL      Glucose 102 mg/dL      Calcium 9.3 mg/dL      eGFR 94 ml/min/1.73sq m     Narrative:      National Kidney Disease Foundation guidelines for Chronic Kidney Disease (CKD):     Stage 1 with normal or high GFR (GFR > 90 mL/min/1.73 square meters)    Stage 2 Mild CKD (GFR = 60-89 mL/min/1.73 square meters)    Stage 3A Moderate CKD (GFR = 45-59 mL/min/1.73 square meters)    Stage 3B Moderate CKD (GFR = 30-44 mL/min/1.73 square meters)    Stage 4 Severe CKD (GFR = 15-29 mL/min/1.73 square meters)    Stage 5 End Stage CKD (GFR <15 mL/min/1.73 square meters)  Note: GFR calculation is accurate only with a steady state creatinine    Urine Microscopic [[id number]]  (Abnormal) Collected: 06/02/25 0841    Lab Status: Final result Specimen: Urine, Clean Catch Updated: 06/02/25 0853     RBC, UA Innumerable /hpf      WBC, UA 30-50 /hpf      Epithelial Cells Occasional /hpf      Bacteria, UA None Seen /hpf      MUCUS THREADS Occasional     Hyaline Casts, UA 3-5 /lpf     Urine culture [[id number]] Collected: 06/02/25 0841    Lab Status: In process Specimen: Urine, Clean Catch Updated: 06/02/25 0852    UA w  Reflex to Microscopic w Reflex to Culture [[id number]]  (Abnormal) Collected: 06/02/25 0841    Lab Status: Final result Specimen: Urine, Clean Catch Updated: 06/02/25 0847     Color, UA Yellow     Clarity, UA Turbid     Specific Gravity, UA 1.022     pH, UA 6.0     Leukocytes, UA Small     Nitrite, UA Negative     Protein,  (2+) mg/dl      Glucose, UA Negative mg/dl      Ketones, UA Negative mg/dl      Urobilinogen, UA <2.0 mg/dl      Bilirubin, UA Negative     Occult Blood, UA Large    CBC and differential [[id number]]  (Abnormal) Collected: 06/02/25 0834    Lab Status: Final result Specimen: Blood from Arm, Right Updated: 06/02/25 0845     WBC 4.38 Thousand/uL      RBC 5.02 Million/uL      Hemoglobin 15.3 g/dL      Hematocrit 45.8 %      MCV 91 fL      MCH 30.5 pg      MCHC 33.4 g/dL      RDW 13.2 %      MPV 10.8 fL      Platelets 141 Thousands/uL      nRBC 0 /100 WBCs      Segmented % 66 %      Immature Grans % 0 %      Lymphocytes % 17 %      Monocytes % 11 %      Eosinophils Relative 5 %      Basophils Relative 1 %      Absolute Neutrophils 2.94 Thousands/µL      Absolute Immature Grans 0.01 Thousand/uL      Absolute Lymphocytes 0.75 Thousands/µL      Absolute Monocytes 0.46 Thousand/µL      Eosinophils Absolute 0.20 Thousand/µL      Basophils Absolute 0.02 Thousands/µL             CT renal stone study abdomen pelvis without contrast   Final Interpretation by Sancho Dickerson MD (06/02 0936)      1.  Nonobstructing right renal calculi. No hydronephrosis.   2.  4 cm bladder calculus.   3.  Diffuse thickening of the urinary bladder wall. Clinical correlation for cystitis.      The study was marked in EPIC for immediate notification.      Workstation performed: [id number]             Procedures    ED Medication and Procedure Management   Prior to Admission Medications   Prescriptions Last Dose Informant Patient Reported? Taking?   bisacodyl (FLEET) 10 MG/30ML ENEM   No No   Sig: Insert 30 mL (10 mg total)  into the rectum once for 1 dose . Use 1-2 hours prior to prostate biopsy   finasteride (PROSCAR) 5 mg tablet  Self No No   Sig: Take 1 tablet (5 mg total) by mouth daily   tamsulosin (FLOMAX) 0.4 mg   No No   Sig: Take 1 capsule (0.4 mg total) by mouth daily with dinner      Facility-Administered Medications: None     Discharge Medication List as of 6/2/2025 12:40 PM        START taking these medications    Details   cephalexin (KEFLEX) 500 mg capsule Take 1 capsule (500 mg total) by mouth 3 (three) times a day for 7 days, Starting Mon 6/2/2025, Until Mon 6/9/2025, Normal      oxybutynin (DITROPAN-XL) 5 mg 24 hr tablet Take 1 tablet (5 mg total) by mouth daily, Starting Mon 6/2/2025, Normal      phenazopyridine (PYRIDIUM) 200 mg tablet Take 1 tablet (200 mg total) by mouth 3 (three) times a day, Starting Mon 6/2/2025, Normal           CONTINUE these medications which have NOT CHANGED    Details   bisacodyl (FLEET) 10 MG/30ML ENEM Insert 30 mL (10 mg total) into the rectum once for 1 dose . Use 1-2 hours prior to prostate biopsy, Starting Thu 3/13/2025, Normal      finasteride (PROSCAR) 5 mg tablet Take 1 tablet (5 mg total) by mouth daily, Starting Fri 1/31/2025, Normal      tamsulosin (FLOMAX) 0.4 mg Take 1 capsule (0.4 mg total) by mouth daily with dinner, Starting Wed 3/12/2025, Normal           No discharge procedures on file.  ED SEPSIS DOCUMENTATION   Time reflects when diagnosis was documented in both MDM as applicable and the Disposition within this note       Time User Action Codes Description Comment    6/2/2025 12:36 PM Trey Le [R31.9] Hematuria     6/2/2025 12:37 PM Trey Le [N21.0] Bladder stone     6/2/2025 12:37 PM Trey Le [N30.00] Acute cystitis     6/2/2025 12:37 PM Trey Le [R39.9] Lower urinary tract symptoms (LUTS)                    [1]   Past Medical History:  Diagnosis Date    Asthma     pt. states he had asthma when he was a child    GERD  (gastroesophageal reflux disease)    [2]   Past Surgical History:  Procedure Laterality Date    SD COLONOSCOPY FLX DX W/COLLJ SPEC WHEN PFRMD N/A 8/21/2017    Procedure: COLONOSCOPY;  Surgeon: Armando Davila III, MD;  Location: [facility];  Service: Gastroenterology   [3]   Family History  Problem Relation Name Age of Onset    Hypertension Father     [4]   Social History  Tobacco Use    Smoking status: Never    Smokeless tobacco: Never   Vaping Use    Vaping status: Never Used   Substance Use Topics    Alcohol use: No    Drug use: No        Trey Le PA-C  06/02/25 0807

## 2025-06-03 LAB — BACTERIA UR CULT: NORMAL

## 2025-06-12 NOTE — PROGRESS NOTES
"60-year-old male with elevated PSA, gross hematuria    No anticoagulation    On Flomax and finasteride for PSA    PSA 18.86 on 1/15/2024    Negative prostate biopsy with [facility] in March 2024    PSA 31.73 on 10/22/2024    Prostate MRI 2/26/2025: PI-RADS 2; 102 g prostate; bladder stone    PSA 30.259 on 3/12/2025    Seen in the office in March 2025 and noted gross hematuria            Biopsy prostate     Date/Time  6/13/2025 2:00 PM     Performed by  Blair Caban DO   Authorized by  Blair Caban DO     Universal Protocol   procedure performed by consultantConsent: Verbal consent obtained. Written consent obtained  Risks and benefits: risks, benefits and alternatives were discussed  Consent given by: patient  Time out: Immediately prior to procedure a \"time out\" was called to verify the correct patient, procedure, equipment, support staff and site/side marked as required.  Timeout called at: 6/13/2025 2:00 PM.  Patient understanding: patient states understanding of the procedure being performed  Patient consent: the patient's understanding of the procedure matches consent given  Procedure consent: procedure consent matches procedure scheduled  Relevant documents: relevant documents present and verified  Required items: required blood products, implants, devices, and special equipment available  Patient identity confirmed: verbally with patient      Local anesthesia used: yes      Anesthesia: local infiltration     Anesthesia   Local anesthesia used: yes  Local Anesthetic: lidocaine 2% without epinephrine  Anesthetic total: 10 mL     Sedation   Patient sedated: no        Specimen: yes (12 cores)    Culture: no   Procedure Details   Procedure Notes: Office TRUS-guided Prostate Biopsy Procedure Note    Indication    Elevated PSA    Informed consent   The risks, benefits and alternatives to this procedures were discussed with the patient and he has participated in the informed consent process and " wishes to proceed    Antibiotic prophylaxis   Rocephin    Rectal cleansing  The patient was instructed to perform an evacuating rectal enema 1-2 hours prior to biopsy.    Local anesthesia  Topical 2% lidocaine jelly was applied liberally to the anus and rectum and allowed to dwell for at least 5 min prior to starting the procedure.  After insertion of the TRUS probe, 10 mL of 2% lidocaine solution was injected with ultrasound guidance at the  junction of the prostate and seminal vesicles. The anesthetic was allowed to dwell for at least 2 minutes prior to biopsy.    Transrectal ultrasonography  The patient was placed in the left lateral decubitus position. After an attentive digital rectal examination, a 7.5 mHz sidefire ultrasound probe was gently inserted into the rectum and biplanar imaging of the prostate was done with the findings noted below. Images were taken of any abnormal findings and also to document prostate size.    Bladder  The bladder base appeared normal.    Prostate      Ultrasound size measurements:  -Volume:  91.13 cm3    Ultrasound findings:  -Cysts: None  -Masses: None  -Median lobe: present    Clinical stage (assuming a positive biopsy):   -T1c.    TRUS-guided needle biopsy  Using an 18 gauge biopsy needle and ultrasound guidance, the following biopsies were taken:    1 core(s) from the left lateral base.  1 core(s) from the left lateral mid-gland.  1 core(s) from the right middle base.  1 core(s) from the right lateral base.  1 core(s) from the left lateral mid-gland.  1 core(s) from the left middle mid-gland.  1 core(s) from the right middle mid-gland.  1 core(s) from the right lateral mid-gland.  1 core(s) from the left lateral apex.  1 core(s) from the left middle apex.  1 core(s) from the right middle apex.  1 core(s) from the right lateral apex    Total number of cores: 12                Complications  There were no procedural complications.    Disposition  The patient was dismissed to  "home     Post-procedure instructions:     Today he underwent an uncomplicated transrectal ultrasound-guided biopsy of the prostate, following a jason-prostatic nerve block.I reviewed the normal post-procedure course including bleeding per rectum, hematuria, and hematospermia.  . Instructed him to call with fever greater than 101, chills, nausea, vomiting, and poorly controlled pain. His followup was scheduled in 2 to 4 weeks' time to review the pathology.          Patient Transportation: confirmed  Patient tolerance: patient tolerated the procedure well with no immediate complications            Cystoscopy     Date/Time  6/13/2025 2:00 PM     Performed by  Blair Caban DO   Authorized by  Blair Caban DO       Universal Protocol:  procedure performed by consultantConsent: Verbal consent obtained. Written consent obtained  Risks and benefits: risks, benefits and alternatives were discussed  Consent given by: patient  Time out: Immediately prior to procedure a \"time out\" was called to verify the correct patient, procedure, equipment, support staff and site/side marked as required.  Timeout called at: 6/13/2025 4:36 PM.  Patient understanding: patient states understanding of the procedure being performed  Patient consent: the patient's understanding of the procedure matches consent given  Procedure consent: procedure consent matches procedure scheduled  Relevant documents: relevant documents present and verified  Required items: required blood products, implants, devices, and special equipment available  Patient identity confirmed: verbally with patient      Procedure Details:  Procedure type: cystoscopy    Patient tolerance: Patient tolerated the procedure well with no immediate complications    Additional Procedure Details: Office Cystoscopy Procedure Note    Indication:    Hematuria    Informed consent   The risks, benefits, complications, treatment options, and expected outcomes were discussed " with the patient. The patient concurred with the proposed plan and provided informed consent.    Anesthesia  Lidocaine jelly 2%    Antibiotic prophylaxis   None    Procedure  The patient was placed in the supineposition, was prepped and draped in the usual manner using sterile technique, and 2% lidocaine jelly instilled into the urethra.  A 17 F flexible cystoscope was then inserted into the urethra and the urethra and bladder carefully examined.  The following findings were noted:    Findings:  Urethra:  Normal  Prostate:  BL lateral lobe hypertrophy, enlarged median lobe with significant intravesical component, no lesions  Bladder:  Severe trabeculations, dome chimney, approximately 4 cm bladder stone, no papillary lesions visible  Ureteral orifices:  orthotopic  Other findings:  None, retroflexed view confirms    Specimens: None                 Complications:    None; patient tolerated the procedure well           Disposition: To home            Condition: Stable                PLAN  - Scheduled for pathology review on 7/8/2025 at 7:30 AM  - Assuming his pathology is negative for clinically significant prostate cancer, we will discuss further bladder outlet procedures and surgery for his large bladder stone

## 2025-06-13 ENCOUNTER — PROCEDURE VISIT (OUTPATIENT)
Dept: UROLOGY | Facility: CLINIC | Age: 60
End: 2025-06-13
Payer: COMMERCIAL

## 2025-06-13 VITALS
WEIGHT: 196 LBS | DIASTOLIC BLOOD PRESSURE: 68 MMHG | TEMPERATURE: 97.5 F | HEIGHT: 73 IN | HEART RATE: 94 BPM | OXYGEN SATURATION: 96 % | BODY MASS INDEX: 25.98 KG/M2 | SYSTOLIC BLOOD PRESSURE: 122 MMHG

## 2025-06-13 DIAGNOSIS — R97.20 ELEVATED PSA: ICD-10-CM

## 2025-06-13 DIAGNOSIS — R31.0 GROSS HEMATURIA: Primary | ICD-10-CM

## 2025-06-13 LAB
SL AMB  POCT GLUCOSE, UA: NORMAL
SL AMB LEUKOCYTE ESTERASE,UA: NORMAL
SL AMB POCT BILIRUBIN,UA: NORMAL
SL AMB POCT BLOOD,UA: NORMAL
SL AMB POCT CLARITY,UA: CLEAR
SL AMB POCT COLOR,UA: YELLOW
SL AMB POCT KETONES,UA: NORMAL
SL AMB POCT NITRITE,UA: NORMAL
SL AMB POCT PH,UA: 5
SL AMB POCT SPECIFIC GRAVITY,UA: 1
SL AMB POCT URINE PROTEIN: NORMAL
SL AMB POCT UROBILINOGEN: 0.2

## 2025-06-13 PROCEDURE — 96372 THER/PROPH/DIAG INJ SC/IM: CPT

## 2025-06-13 PROCEDURE — 76942 ECHO GUIDE FOR BIOPSY: CPT | Performed by: UROLOGY

## 2025-06-13 PROCEDURE — 76872 US TRANSRECTAL: CPT | Performed by: UROLOGY

## 2025-06-13 PROCEDURE — 87086 URINE CULTURE/COLONY COUNT: CPT | Performed by: UROLOGY

## 2025-06-13 PROCEDURE — G0416 PROSTATE BIOPSY, ANY MTHD: HCPCS | Performed by: STUDENT IN AN ORGANIZED HEALTH CARE EDUCATION/TRAINING PROGRAM

## 2025-06-13 PROCEDURE — 88344 IMHCHEM/IMCYTCHM EA MLT ANTB: CPT | Performed by: STUDENT IN AN ORGANIZED HEALTH CARE EDUCATION/TRAINING PROGRAM

## 2025-06-13 PROCEDURE — 55700 PR PROSTATE NEEDLE BIOPSY ANY APPROACH: CPT | Performed by: UROLOGY

## 2025-06-13 PROCEDURE — 52000 CYSTOURETHROSCOPY: CPT | Performed by: UROLOGY

## 2025-06-13 PROCEDURE — 81002 URINALYSIS NONAUTO W/O SCOPE: CPT | Performed by: UROLOGY

## 2025-06-13 RX ORDER — CEFTRIAXONE 1 G/1
1000 INJECTION, POWDER, FOR SOLUTION INTRAMUSCULAR; INTRAVENOUS ONCE
Status: COMPLETED | OUTPATIENT
Start: 2025-06-13 | End: 2025-06-13

## 2025-06-13 RX ADMIN — CEFTRIAXONE 1000 MG: 1 INJECTION, POWDER, FOR SOLUTION INTRAMUSCULAR; INTRAVENOUS at 15:47

## 2025-06-13 NOTE — PATIENT INSTRUCTIONS
Gel form applied to 4 incision sites on the right leg, wrapped with Kerlix and Coban, PMS intact.      Segundo Valerio RN  06/12/22 8801 Transrectal prostate biopsy post-procedure instructions    You had a transrectal prostate biopsy today. We took tissue biopsy cores of your prostate through multiple needle pokes that went through your rectal mucosa directly into your prostate.    It is normal to have blood in your urine, semen, and stool for the next few days to weeks. Please call office if any of this bleeding is constant or high volume. Also call office if you are passing blood clots in your urine and/or you are not able to urinate.    Some men can have erectile dysfunction for a few weeks after the procedure. If you experience this issue, do not be alarmed. If problem persists after a month or so, let your provider know.    There is a low, but non-zero risk of developing a serious infection after this procedure. You received a strong antibiotic before the procedure to minimize this risk. If you experience fevers, chills, nausea, vomiting, or any other concerning symptoms, call the office or present to ED right away.    Diet    You may resume your regular diet after the procedure.    Please stay hydrated and drink plenty of fluids.    Activity    For the next few days you should try to take it easy and avoid lifting anything heavier than carton of milk. Avoid long car rides for the next few days. Please do your best to not strain when urinating or having a bowel movement, as this can make bleeding worse. Take over the counter stool softener if needed.    For the next few weeks, please do not do anything that puts extra pressure on your perineum. These activities include but are not limited to: bike riding, motorcycle riding, strenuous running/jumping/lifting exercises, horse riding.    About 1 week after you experience no bleeding in urine and stool, you can start to run and do light weight exercises.    It is best to avoid sex or ejaculation for about 1 week after procedure. The first time you ejaculate, you may see a lot of blood in the semen.  Do not be alarmed. This is normal. In subsequent ejaculations, you should see less and less blood. Call office if you have concerns.    Walking is okay and is encouraged after your procedure. Try to not to do too much movement the day of your procedure, but starting the day after procedure, please try to move around.    Hygiene    You can shower as normal starting the day of your procedure. Avoid baths/hot tubs/pools/standing body of water for 5 days after procedure.    If you are having some spot bleeding from your rectum after procedure, you can place gauze or protective pad in your underwear.    Medications    Take over the counter Tylenol as needed for pain or discomfort.    Take all of your other pre-existing medications as scheduled.    Follow-up    You will follow up in the office on 7/8/2025 at 7:30 AM to review your results.    You may see your pathology results before our office does.  You may have a lot of questions you would like to ask.  You already are (or will soon be) scheduled for an office visit with a physician, at which time you will have the full undivided attention of the physician to talk about your pathology results.  Please do your best not to call the office about your pathology results, as this often leads to more anxiety. You will find that it is a significantly more productive conversation when performed face-to-face.          You had cystoscopy done in the office today. This means that we looked inside your urethra and bladder with a camera.    You may see some blood in your urine for the next few days. This is normal. Please drink plenty of fluids. Call the office if you are passing large blood clots in your urine or if you are not able to urinate.    It may burn when you urinate for the next few days. This is normal.    Please call the office if you have fevers or chills in the next few days.    You will return to clinic in a few weeks

## 2025-06-14 LAB — BACTERIA UR CULT: NORMAL

## 2025-06-17 PROCEDURE — G0416 PROSTATE BIOPSY, ANY MTHD: HCPCS | Performed by: STUDENT IN AN ORGANIZED HEALTH CARE EDUCATION/TRAINING PROGRAM

## 2025-06-17 PROCEDURE — 88344 IMHCHEM/IMCYTCHM EA MLT ANTB: CPT | Performed by: STUDENT IN AN ORGANIZED HEALTH CARE EDUCATION/TRAINING PROGRAM

## 2025-07-03 DIAGNOSIS — N13.8 BENIGN PROSTATIC HYPERPLASIA WITH URINARY OBSTRUCTION: ICD-10-CM

## 2025-07-03 DIAGNOSIS — N40.1 BENIGN PROSTATIC HYPERPLASIA WITH URINARY OBSTRUCTION: ICD-10-CM

## 2025-07-03 NOTE — PROGRESS NOTES
Name: Ethan Villarreal      : 1965      MRN: [id number]  Encounter Provider: Blair Caban DO  Encounter Date: 2025   Encounter department: [facility]OLOG[facility] [place]  :  Assessment & Plan  Elevated PSA       Reviewed his prostate biopsy results in the office today.  Reviewed with him that his prostate biopsy was negative for cancer.  He also had a negative prostate biopsy in  with [facility].    Explained that overall, his elevated PSA is most likely due to his significantly enlarged prostate, around 100 g.    See discussion below regarding BPH surgery.    He understands that he will have continued PSA monitoring after surgery.  Benign prostatic hyperplasia with lower urinary tract symptoms, symptom details unspecified           Patient is interested in proceeding with Holmium laser enucleation based on our discussion today.      Laser enucleation of the prostate offers a completely endoscopic approach (no incisions) to definitively remove prostate tissue obstructing the bladder with lower risk of bleeding complications compared to other endoscopic procedures, which is why I commonly offer Laser enucleation of the prostate.  Additional advantages of laser enucleation of the prostate include, very low risk of re-treatment over time, shorter hospital stay and in some cases no hospital stay required, shorter indwelling Urban catheter times, and the safety of this treatment modality in complex patients on blood thinning medication (ex. Eliquis, Xarelto, Coumadin).      I explained a HoLEP is a surgery performed with special equipment through the urethra to remove obstructing prostate tissue that is causing urinary symptoms.  Usually a Urban catheter will be placed for less than 24 hours after surgery. Some patients may require hospitalization overnight with continuous bladder irrigation through the Urban catheter.  It is common to have irritative voiding symptoms such as  urinary urgency/frequency/nocturia for several weeks following a HoLEP; it is also common to see blood in the urine during this time period. There is a risk of retrograde ejaculation in up to 75% of patients after HoLEP. The risk of erectile dysfunction is rare following a HoLEP. The risk of transient stress urinary incontinence lasting 3-6 months following surgery is between 10-30% based on the literature.  The risk of long term urinary incontinence is rare, 1-2%.  Additional side effects that we discussed today include urethral stricture and bladder neck contracture, which occurs in 1-2% of patients. The risk of needing to make a cystotomy or a bladder incision to retrieve the enucleated tissue in the event that the morcellator does not work was discussed. Other reasons why a cystotomy would have to be made include bladder injury from irrigation fluid used during the procedure or bladder injury from placement of a Urban catheter.     I also explained to the patient that proper voiding requires open bladder outlet as well as proper underlying bladder function. He understands that even if we create a nice opening in the prostate after surgery, he still may not ideally void due to poor bladder function. He does understand that sometimes underlying bladder voiding mechanics can improve once a proper outlet opening is created, however, he also understands that this is not always the case. In summary the patient understands that even despite a proper HoLEP surgery, voiding issues may remain after the procedure.    We also discussed the possibility of finding prostate cancer pathology in the HoLEP specimen. I explained that all of the prostate tissue removed during the procedure gets examined by the pathology team, and there have been instances where prostate cancer is identified. I explained that while incidentally found prostate cancer is rare, it is usually low risk prostate cancer that can typically be monitored  rather than aggressively treated. Should the patient have intermediate or high risk prostate cancer identified, he would have the risk based treatment options reviewed with him. I explained that in these scenarios, men are often treated with radiation.    The patient understands the risk and benefits of a HoLEP and would like to proceed with surgery.      I took time to answer any questions he had regarding the procedure.          We also discussed his large bladder stone.  I explained that during his laser enucleation procedure we would also be able to do cystolitholapaxy.  He was also consented for this.    Explained that risks of this also include infection, bleeding, damage to bladder, need for Urban catheter, need for open repair.      Overall, he was consented for holmium laser nucleation of the prostate, cystolitholapaxy.    Encounter to establish care    Orders:    Ambulatory Referral to Family Practice; Future  Patient asked for referral to PCP.  I placed the order.      PLAN  -Consented for holmium laser nucleation of prostate, cystolitholapaxy  -PATs, Ucx  -He understands a technically complete the gross hematuria workup he would need CT renal protocol and urine cytology.  However, we discussed that given his bladder stone and large prostate, this is the most likely cause and we will hold off on further workup regarding gross hematuria unless he has this persistent even after he heals up from his outlet surgery.        History of Present Illness   Ethan Villarreal is a 60 y.o. male with elevated PSA, gross hematuria     No anticoagulation     On Flomax and finasteride for PSA     PSA 18.86 on 1/15/2024     Negative prostate biopsy with [place] in March 2024     PSA 31.73 on 10/22/2024     Prostate MRI 2/26/2025: PI-RADS 2; 102 g prostate; bladder stone     PSA 30.259 on 3/12/2025     Seen in the office in March 2025 and noted gross hematuria    TRUS Pbx 6/13/25: neg  -Volume:  91.13 cm3     Cysto  "6/13/25:  Urethra:                      Normal  Prostate:                    BL lateral lobe hypertrophy, enlarged median lobe with significant intravesical component, no lesions  Bladder:                     Severe trabeculations, dome chimney, approximately 4 cm bladder stone, no papillary lesions visible  Ureteral orifices:       orthotopic  Other findings:          None, retroflexed view confirms    Office 7/8/25: straining, weak stream, noct x5, freq, urgency    Review of Systems   Constitutional:  Negative for chills and fever.   Respiratory:  Negative for cough and shortness of breath.    Genitourinary:  Negative for dysuria and hematuria.   Neurological:  Negative for dizziness and headaches.   Psychiatric/Behavioral:  Negative for agitation and behavioral problems.           Objective   /72 (BP Location: Left arm, Patient Position: Sitting, Cuff Size: Standard)   Pulse 79   Temp 97.6 °F (36.4 °C)   Ht 6' 1\" (1.854 m)   Wt 88.5 kg (195 lb)   SpO2 96%   BMI 25.73 kg/m²     Physical Exam  Constitutional:       General: He is not in acute distress.  HENT:      Head: Normocephalic and atraumatic.   Pulmonary:      Effort: Pulmonary effort is normal. No respiratory distress.   Abdominal:      General: Abdomen is flat.      Palpations: Abdomen is soft.      Tenderness: There is no right CVA tenderness or left CVA tenderness.     Skin:     General: Skin is warm and dry.     Neurological:      General: No focal deficit present.      Mental Status: He is alert and oriented to person, place, and time.     Psychiatric:         Mood and Affect: Mood normal.         Behavior: Behavior normal.           Results   Lab Results   Component Value Date    PSA 30.259 (H) 03/12/2025     Lab Results   Component Value Date    CALCIUM 9.3 06/02/2025    K 4.0 06/02/2025    CO2 29 06/02/2025     06/02/2025    BUN 10 06/02/2025    CREATININE 0.87 06/02/2025     Lab Results   Component Value Date    WBC 4.38 " 06/02/2025    HGB 15.3 06/02/2025    HCT 45.8 06/02/2025    MCV 91 06/02/2025     (L) 06/02/2025       Office Urine Dip  No results found for this or any previous visit (from the past hour).      Administrative Statements   I have spent a total time of 45 minutes in caring for this patient on the day of the visit/encounter including Diagnostic results, Prognosis, Risks and benefits of tx options, Instructions for management, Patient and family education, Importance of tx compliance, Impressions, Counseling / Coordination of care, Documenting in the medical record, Reviewing/placing orders in the medical record (including tests, medications, and/or procedures), and Obtaining or reviewing history  .

## 2025-07-07 RX ORDER — FINASTERIDE 5 MG/1
5 TABLET, FILM COATED ORAL DAILY
Qty: 90 TABLET | Refills: 1 | Status: SHIPPED | OUTPATIENT
Start: 2025-07-07

## 2025-07-08 ENCOUNTER — OFFICE VISIT (OUTPATIENT)
Dept: UROLOGY | Facility: CLINIC | Age: 60
End: 2025-07-08
Payer: COMMERCIAL

## 2025-07-08 VITALS
SYSTOLIC BLOOD PRESSURE: 134 MMHG | DIASTOLIC BLOOD PRESSURE: 72 MMHG | BODY MASS INDEX: 25.84 KG/M2 | WEIGHT: 195 LBS | OXYGEN SATURATION: 96 % | TEMPERATURE: 97.6 F | HEIGHT: 73 IN | HEART RATE: 79 BPM

## 2025-07-08 DIAGNOSIS — R97.20 ELEVATED PSA: Primary | ICD-10-CM

## 2025-07-08 DIAGNOSIS — N40.1 BENIGN PROSTATIC HYPERPLASIA WITH LOWER URINARY TRACT SYMPTOMS, SYMPTOM DETAILS UNSPECIFIED: ICD-10-CM

## 2025-07-08 DIAGNOSIS — Z76.89 ENCOUNTER TO ESTABLISH CARE: ICD-10-CM

## 2025-07-08 PROCEDURE — 99215 OFFICE O/P EST HI 40 MIN: CPT | Performed by: UROLOGY

## 2025-07-08 RX ORDER — SODIUM CHLORIDE, SODIUM LACTATE, POTASSIUM CHLORIDE, CALCIUM CHLORIDE 600; 310; 30; 20 MG/100ML; MG/100ML; MG/100ML; MG/100ML
75 INJECTION, SOLUTION INTRAVENOUS CONTINUOUS
OUTPATIENT
Start: 2025-07-08

## 2025-07-09 ENCOUNTER — PREP FOR PROCEDURE (OUTPATIENT)
Dept: UROLOGY | Facility: CLINIC | Age: 60
End: 2025-07-09

## 2025-07-09 ENCOUNTER — TELEPHONE (OUTPATIENT)
Dept: UROLOGY | Facility: CLINIC | Age: 60
End: 2025-07-09

## 2025-07-09 DIAGNOSIS — R39.89 SUSPECTED UTI: Primary | ICD-10-CM

## 2025-07-09 DIAGNOSIS — Z01.810 PRE-OPERATIVE CARDIOVASCULAR EXAMINATION: ICD-10-CM

## 2025-07-09 DIAGNOSIS — Z01.812 PRE-OPERATIVE LABORATORY EXAMINATION: ICD-10-CM

## 2025-07-09 DIAGNOSIS — N40.1 BENIGN PROSTATIC HYPERPLASIA WITH LOWER URINARY TRACT SYMPTOMS, SYMPTOM DETAILS UNSPECIFIED: ICD-10-CM

## 2025-07-09 NOTE — TELEPHONE ENCOUNTER
Spoke with patient and confirmed surgery date of: 09/29/25  Type of surgery:Holep  Operating physician: Dr. Caban  Location of surgery: [facility]    Verbally went over prep with patient on: 07/09/25  NPO  Bowel prep? No  Hospital calls afternoon prior with arrival time -Calls Friday afternoon for Monday surgeries  Patient needs ride to and from surgery (outpatient/inpatient)   Pre-op testing to be done 2 weeks prior to surgery  Cbc bmp u/c EKG t/s  Blood thinners:   Pat will call a week prior   Clearances needed: none    Mailed/emailed to patient on:  Copy of packet scanned into Media on:  Labs in packet  Soap / Bowel prep in packet  Pre-op & Post-op in packet  Dates of H&P and post-op if needed    Consent: in Media